# Patient Record
Sex: FEMALE | Race: WHITE | NOT HISPANIC OR LATINO | Employment: OTHER | URBAN - METROPOLITAN AREA
[De-identification: names, ages, dates, MRNs, and addresses within clinical notes are randomized per-mention and may not be internally consistent; named-entity substitution may affect disease eponyms.]

---

## 2022-07-13 ENCOUNTER — TRANSFERRED RECORDS (OUTPATIENT)
Dept: HEALTH INFORMATION MANAGEMENT | Facility: CLINIC | Age: 67
End: 2022-07-13

## 2022-07-15 ENCOUNTER — TRANSFERRED RECORDS (OUTPATIENT)
Dept: HEALTH INFORMATION MANAGEMENT | Facility: CLINIC | Age: 67
End: 2022-07-15

## 2022-10-24 ENCOUNTER — TRANSFERRED RECORDS (OUTPATIENT)
Dept: HEALTH INFORMATION MANAGEMENT | Facility: CLINIC | Age: 67
End: 2022-10-24

## 2022-11-04 ENCOUNTER — TRANSFERRED RECORDS (OUTPATIENT)
Dept: HEALTH INFORMATION MANAGEMENT | Facility: CLINIC | Age: 67
End: 2022-11-04

## 2023-01-10 ENCOUNTER — TRANSFERRED RECORDS (OUTPATIENT)
Dept: HEALTH INFORMATION MANAGEMENT | Facility: CLINIC | Age: 68
End: 2023-01-10

## 2023-01-30 ENCOUNTER — TRANSFERRED RECORDS (OUTPATIENT)
Dept: HEALTH INFORMATION MANAGEMENT | Facility: CLINIC | Age: 68
End: 2023-01-30

## 2023-02-02 ENCOUNTER — TRANSFERRED RECORDS (OUTPATIENT)
Dept: HEALTH INFORMATION MANAGEMENT | Facility: CLINIC | Age: 68
End: 2023-02-02

## 2023-02-02 LAB — EJECTION FRACTION: 60 %

## 2023-03-03 ENCOUNTER — TRANSFERRED RECORDS (OUTPATIENT)
Dept: HEALTH INFORMATION MANAGEMENT | Facility: CLINIC | Age: 68
End: 2023-03-03

## 2023-04-12 ENCOUNTER — TRANSFERRED RECORDS (OUTPATIENT)
Dept: HEALTH INFORMATION MANAGEMENT | Facility: CLINIC | Age: 68
End: 2023-04-12

## 2023-11-10 ENCOUNTER — TRANSFERRED RECORDS (OUTPATIENT)
Dept: HEALTH INFORMATION MANAGEMENT | Facility: CLINIC | Age: 68
End: 2023-11-10

## 2023-11-17 ENCOUNTER — TRANSFERRED RECORDS (OUTPATIENT)
Dept: HEALTH INFORMATION MANAGEMENT | Facility: CLINIC | Age: 68
End: 2023-11-17

## 2024-03-08 ENCOUNTER — TRANSFERRED RECORDS (OUTPATIENT)
Dept: HEALTH INFORMATION MANAGEMENT | Facility: CLINIC | Age: 69
End: 2024-03-08

## 2024-04-03 ENCOUNTER — TRANSFERRED RECORDS (OUTPATIENT)
Dept: MULTI SPECIALTY CLINIC | Facility: CLINIC | Age: 69
End: 2024-04-03

## 2024-05-02 ENCOUNTER — TRANSFERRED RECORDS (OUTPATIENT)
Dept: HEALTH INFORMATION MANAGEMENT | Facility: CLINIC | Age: 69
End: 2024-05-02

## 2024-06-05 ENCOUNTER — TRANSFERRED RECORDS (OUTPATIENT)
Dept: HEALTH INFORMATION MANAGEMENT | Facility: CLINIC | Age: 69
End: 2024-06-05

## 2024-07-10 ENCOUNTER — TRANSFERRED RECORDS (OUTPATIENT)
Dept: HEALTH INFORMATION MANAGEMENT | Facility: CLINIC | Age: 69
End: 2024-07-10

## 2024-08-29 ENCOUNTER — TELEPHONE (OUTPATIENT)
Dept: INTERNAL MEDICINE | Facility: CLINIC | Age: 69
End: 2024-08-29
Payer: MEDICARE

## 2024-08-29 NOTE — TELEPHONE ENCOUNTER
Reason for Call:  Appointment Request    Patient requesting this type of appt: Chronic Diease Management/Medication/Follow-Up    Requested provider:  Dr. Rodrigez    Reason patient unable to be scheduled: Needs to be scheduled by clinic    When does patient want to be seen/preferred time: 1-2 weeks    Comments: New patient and her  Cristofer Ball are wanting to Est care and looking to schedule appointments back to back within the next couple weeks..     Okay to leave a detailed message?: Yes at Home number on file [phone] (home)    Call taken on 8/29/2024 at 10:41 AM by Gabby Dickerson

## 2024-09-23 ENCOUNTER — TELEPHONE (OUTPATIENT)
Dept: INTERNAL MEDICINE | Facility: CLINIC | Age: 69
End: 2024-09-23
Payer: MEDICARE

## 2024-09-23 NOTE — TELEPHONE ENCOUNTER
Patient called in stating she has been testing positive for COVID for the last 5 days. She states her first day of symptom was Andres 9/15/24. She is wondering if she can come for her appointment tomorrow and wear a mask or if she needs to reschedule. She was notified that we do not exclude patients from being seen in clinic due to having COVID, but that we could check with the provider to see if her visit could be done virtually. She states she will come to the clinic with a mask on.       Anay Garcia, VELASQUEZN, RN

## 2024-09-24 ENCOUNTER — OFFICE VISIT (OUTPATIENT)
Dept: INTERNAL MEDICINE | Facility: CLINIC | Age: 69
End: 2024-09-24
Payer: MEDICARE

## 2024-09-24 VITALS
HEIGHT: 63 IN | HEART RATE: 102 BPM | WEIGHT: 225 LBS | DIASTOLIC BLOOD PRESSURE: 73 MMHG | SYSTOLIC BLOOD PRESSURE: 117 MMHG | RESPIRATION RATE: 21 BRPM | BODY MASS INDEX: 39.87 KG/M2 | TEMPERATURE: 98.3 F | OXYGEN SATURATION: 96 %

## 2024-09-24 DIAGNOSIS — R60.0 FLUID RETENTION IN LEGS: ICD-10-CM

## 2024-09-24 DIAGNOSIS — E66.01 OBESITY, MORBID, BMI 40.0-49.9 (H): ICD-10-CM

## 2024-09-24 DIAGNOSIS — U07.1 INFECTION DUE TO 2019 NOVEL CORONAVIRUS: ICD-10-CM

## 2024-09-24 DIAGNOSIS — I10 ESSENTIAL HYPERTENSION: ICD-10-CM

## 2024-09-24 DIAGNOSIS — H90.3 BILATERAL SENSORINEURAL HEARING LOSS: Primary | ICD-10-CM

## 2024-09-24 DIAGNOSIS — R19.8 IRREGULAR BOWEL HABITS: ICD-10-CM

## 2024-09-24 PROCEDURE — 99204 OFFICE O/P NEW MOD 45 MIN: CPT | Performed by: INTERNAL MEDICINE

## 2024-09-24 RX ORDER — CELECOXIB 200 MG/1
200 CAPSULE ORAL DAILY
COMMUNITY
Start: 2024-09-24

## 2024-09-24 RX ORDER — TELMISARTAN AND HYDROCHLORTHIAZIDE 80; 25 MG/1; MG/1
1 TABLET ORAL DAILY
COMMUNITY
Start: 2024-09-24

## 2024-09-24 RX ORDER — ASPIRIN 81 MG/1
81 TABLET, CHEWABLE ORAL DAILY
COMMUNITY
Start: 2024-09-24

## 2024-09-24 RX ORDER — CALCIUM CARBONATE 500(1250)
1 TABLET ORAL 2 TIMES DAILY
COMMUNITY
Start: 2024-09-24

## 2024-09-24 RX ORDER — POTASSIUM CHLORIDE 1500 MG/1
20 TABLET, EXTENDED RELEASE ORAL DAILY
COMMUNITY
Start: 2024-09-24

## 2024-09-24 RX ORDER — METAPROTERENOL SULFATE 10 MG
500 TABLET ORAL DAILY
COMMUNITY
Start: 2024-09-24

## 2024-09-24 RX ORDER — ROSUVASTATIN CALCIUM 5 MG/1
5 TABLET, COATED ORAL DAILY
COMMUNITY
Start: 2024-09-24

## 2024-09-24 RX ORDER — FUROSEMIDE 40 MG
40 TABLET ORAL DAILY
COMMUNITY
Start: 2024-09-24

## 2024-09-24 RX ORDER — CHOLECALCIFEROL (VITAMIN D3) 50 MCG
1 TABLET ORAL 2 TIMES DAILY
COMMUNITY
Start: 2024-09-24

## 2024-09-24 ASSESSMENT — PAIN SCALES - GENERAL: PAINLEVEL: MODERATE PAIN (5)

## 2024-09-24 NOTE — PROGRESS NOTES
"  Assessment & Plan   Problem List Items Addressed This Visit       Obesity, morbid, BMI 40.0-49.9 (H)     Other Visit Diagnoses       Bilateral sensorineural hearing loss    -  Primary    Relevant Orders    Adult Audiology  Referral    Irregular bowel habits        Relevant Orders    Adult GI  Referral - Consult Only    Essential hypertension        Relevant Medications    telmisartan-HCTZ (MICARDIS HCT) 80-25 MG tablet    Fluid retention in legs        Relevant Medications    celecoxib (CELEBREX) 200 MG capsule    aspirin (ASA) 81 MG chewable tablet           Patient is moving here from [facility].  She is here to establish care.  She is   Obesity needs to work on exercise and weight loss.  Hypertension she is on telmisartan and hydrochlorothiazide, blood pressure is doing well labs were okay in May.  Fluid retention in her legs is chronic and she is on Lasix 40 mg a day for many years.    Irregular bowel habits she has had some colitis workup recommended to follow-up with GI had a colonoscopy last year.  GI referral was placed    Hearing loss has hearing aids we will refer to audiology for this.    Patient is just getting over COVID infection her lungs are clear today she is feeling much better she will wait 90 days to get a COVID-vaccine and will think about getting the flu shot in the next month.    Follow-up in the future for annual wellness exam.       BMI  Estimated body mass index is 40.24 kg/m  as calculated from the following:    Height as of this encounter: 1.593 m (5' 2.7\").    Weight as of this encounter: 102.1 kg (225 lb).             Eugene Avery is a 68 year old, presenting for the following health issues:  Establish Care (COVID + 9/20/24)      9/24/2024     2:59 PM   Additional Questions   Roomed by Janett LAND     Via the Health Maintenance questionnaire, the patient has reported the following services have been completed DEXA: [facility] nd " "2024-02-26-Colonscopy: [facility] 2024-04-03, this information has been sent to the abstraction team.  History of Present Illness       Reason for visit:  Establish care with new dr as i recently moved here   She is taking medications regularly.      moved here from [facility].     Hearing loss and has a hearing aid.      Mammogram done June 2024.    Arthritis in her body, knees had synvysc injections.     No known heart disease, had an abnormal stress echo but normal PET stress test. No symptoms.     Blood pressure on medication, been doing ok,     Had a colonoscopy for irregular bowels uses suppositories need prescription suppositories, needs to see GI.     Had covid recently and is better now.         Objective    /73   Pulse 102   Temp 98.3  F (36.8  C) (Temporal)   Resp 21   Ht 1.593 m (5' 2.7\")   Wt 102.1 kg (225 lb)   SpO2 96%   BMI 40.24 kg/m    Body mass index is 40.24 kg/m .  Physical Exam   GENERAL: alert and no distress  NECK: no adenopathy, no asymmetry, masses, or scars  RESP: lungs clear to auscultation - no rales, rhonchi or wheezes  CV: regular rate and rhythm, normal S1 S2, no S3 or S4, no murmur, click or rub, no peripheral edema  ABDOMEN: soft, nontender, no hepatosplenomegaly, no masses and bowel sounds normal  MS: no gross musculoskeletal defects noted, no edema            Signed Electronically by: Ruben Rodrigez MD    " Attending Only

## 2024-09-27 ENCOUNTER — TELEPHONE (OUTPATIENT)
Dept: GASTROENTEROLOGY | Facility: CLINIC | Age: 69
End: 2024-09-27

## 2024-09-27 NOTE — TELEPHONE ENCOUNTER
[facility] Call Center    Phone Message    May a detailed message be left on voicemail: Yes    Reason for Call: Other: Patient is currently scheduled on 12/06/2024, as visit type New GI Urgent. This is outside the expected timeline for this referral. Patient has been added to the waitlist.      Action Taken: Message routed to:  Other: GI REFERRAL TRIAGE POOL     Travel Screening: Not Applicable

## 2024-10-04 NOTE — TELEPHONE ENCOUNTER
Clinic Navigator sent a [employer] message to inform the Pt that Pt is on the wait list for a sooner appointment. Clinic Navigator will reach out to the Pt via phone call or [employer] when a sooner appointment becomes available.

## 2024-10-06 ENCOUNTER — HEALTH MAINTENANCE LETTER (OUTPATIENT)
Age: 69
End: 2024-10-06

## 2024-10-14 ENCOUNTER — OFFICE VISIT (OUTPATIENT)
Dept: INTERNAL MEDICINE | Facility: CLINIC | Age: 69
End: 2024-10-14
Payer: MEDICARE

## 2024-10-14 VITALS
OXYGEN SATURATION: 98 % | SYSTOLIC BLOOD PRESSURE: 110 MMHG | BODY MASS INDEX: 40.33 KG/M2 | WEIGHT: 227.6 LBS | RESPIRATION RATE: 16 BRPM | TEMPERATURE: 97.4 F | HEART RATE: 94 BPM | HEIGHT: 63 IN | DIASTOLIC BLOOD PRESSURE: 62 MMHG

## 2024-10-14 DIAGNOSIS — R30.0 DYSURIA: ICD-10-CM

## 2024-10-14 DIAGNOSIS — G89.29 CHRONIC PAIN OF RIGHT KNEE: Primary | ICD-10-CM

## 2024-10-14 DIAGNOSIS — I10 ESSENTIAL HYPERTENSION: ICD-10-CM

## 2024-10-14 DIAGNOSIS — M25.561 CHRONIC PAIN OF RIGHT KNEE: Primary | ICD-10-CM

## 2024-10-14 LAB
ALBUMIN UR-MCNC: NEGATIVE MG/DL
APPEARANCE UR: CLEAR
BILIRUB UR QL STRIP: NEGATIVE
COLOR UR AUTO: NORMAL
GLUCOSE UR STRIP-MCNC: NEGATIVE MG/DL
HGB UR QL STRIP: NEGATIVE
KETONES UR STRIP-MCNC: NEGATIVE MG/DL
LEUKOCYTE ESTERASE UR QL STRIP: NEGATIVE
NITRATE UR QL: NEGATIVE
PH UR STRIP: 5 [PH] (ref 5–7)
SP GR UR STRIP: 1.01 (ref 1–1.03)
UROBILINOGEN UR STRIP-MCNC: NORMAL MG/DL

## 2024-10-14 PROCEDURE — G2211 COMPLEX E/M VISIT ADD ON: HCPCS | Performed by: INTERNAL MEDICINE

## 2024-10-14 PROCEDURE — 99213 OFFICE O/P EST LOW 20 MIN: CPT | Performed by: INTERNAL MEDICINE

## 2024-10-14 PROCEDURE — 81003 URINALYSIS AUTO W/O SCOPE: CPT | Performed by: INTERNAL MEDICINE

## 2024-10-14 ASSESSMENT — PAIN SCALES - GENERAL: PAINLEVEL: SEVERE PAIN (7)

## 2024-10-14 NOTE — PROGRESS NOTES
"  Assessment & Plan   Problem List Items Addressed This Visit    None  Visit Diagnoses       Essential hypertension    -  Primary    Dysuria        Relevant Orders    UA Macroscopic with reflex to Microscopic and Culture - Lab Collect    Chronic pain of right knee        Relevant Orders    Orthopedic  Referral           Right knee pain  Concern for osteoarthritis progression. Exam and history are consistent with osteoarthritis exacerbation. RA is less likely as Gena does not have severe inflammation of the joints and nodules on phalanges are at the DIP versus the PIP, making osteoarthritis more likely. Also less concerning for an acute infection as there is not swelling, redness or diffuse tenderness with the knee exam. Also no systemic symptoms such as fever or chills. Plan moving forward is a referral to orthopedics for evaluation of another injection versus a knee replacement.     Back pain  Originally concerning for a urinary tract infection due to itching with urination and a history of UTIs. Urinalysis is negative for UTI currently. Most likely cause of the back pain is abnormal alignment from the exacerbated knee pain over the last few weeks. Improvement in knee symptoms and continued improvement in back pain since then also makes this more likely. Plan moving forward is to monitor back pain for increase in pain or change in symptoms. Follow-up in clinic if pain becomes worse.    BMI  Estimated body mass index is 40.7 kg/m  as calculated from the following:    Height as of this encounter: 1.593 m (5' 2.7\").    Weight as of this encounter: 103.2 kg (227 lb 9.6 oz).       CONSULTATION/REFERRAL to Orthopedics for chronic osteoarthritis.      The longitudinal plan of care for the diagnosis(es)/condition(s) as documented were addressed during this visit. Due to the added complexity in care, I will continue to support Gena in the subsequent management and with ongoing continuity of " care.      Subjective   Gena is a 68 year old, presenting for the following health issues:  Knee Pain and Back Pain        10/14/2024    10:37 AM   Additional Questions   Roomed by Criselda MONTIEL         10/14/2024    10:37 AM   Patient Reported Additional Medications   Patient reports taking the following new medications Co-Q-10 200 mg, Fish oil     History of Present Illness       Back Pain:  She presents for follow up of back pain. Patient's back pain is a chronic problem.  Location of back pain:  Left upper back, right side of neck and left side of neck  Description of back pain: stabbing  Back pain spreads: nowhere    Since patient first noticed back pain, pain is: always present, but gets better and worse  Does back pain interfere with her job:  Not applicable       Reason for visit:  Significant knee pain (right side in particular) and back pain    She eats 2-3 servings of fruits and vegetables daily.She consumes 1 sweetened beverage(s) daily.She exercises with enough effort to increase her heart rate 20 to 29 minutes per day.  She exercises with enough effort to increase her heart rate 4 days per week.   She is taking medications regularly.     Gena presents today with concerns of back pain and knee pain. She says she has had a long history of osteoarthritis and has had multiple injections of steroids in her knees as well as one shot of sinvisc. The sinvisc shot did provide relief for her in July. She reported that last week she had increased pain and was unable to walk well for the entire weekend. She said that there was an increase in back pain that correlated with the increased knee pain. She says her back pain has improved with slight improvement of knee pain. She is concerned for a kidney infection with her back pain as she is prone to UTIs and feels she has had some increased itching with urination. She doesn't report any pain or burning or blood with urination. She also brought up nodules on her  "distal knuckles and was concerned for RA. Reassurance was given that RA typically affects the proximal knuckles and the distal nodules are more consistent with an osteoarthritis diagnosis.     Flu shot received at an outside location per Gena.Waiting until December to get COVID-19 booster as she was recently COVID positive.            ROS negative except those listed above.      Objective    /62   Pulse 94   Temp 97.4  F (36.3  C) (Temporal)   Resp 16   Ht 1.593 m (5' 2.7\")   Wt 103.2 kg (227 lb 9.6 oz)   SpO2 98%   BMI 40.70 kg/m    Body mass index is 40.7 kg/m .  Physical Exam   GENERAL: alert and no distress  MS: tenderness to palpation along the medial joint line of R. Knee. Minor swelling in medial joint line of the r. Knee. Clicking felt on extension of r. Knee. Left knee insignificant.     Results for orders placed or performed in visit on 10/14/24 (from the past 24 hour(s))   UA Macroscopic with reflex to Microscopic and Culture - Lab Collect    Specimen: Urine, NOS   Result Value Ref Range    Color Urine Straw Colorless, Straw, Light Yellow, Yellow    Appearance Urine Clear Clear    Glucose Urine Negative Negative mg/dL    Bilirubin Urine Negative Negative    Ketones Urine Negative Negative mg/dL    Specific Gravity Urine 1.006 1.003 - 1.035    Blood Urine Negative Negative    pH Urine 5.0 5.0 - 7.0    Protein Albumin Urine Negative Negative mg/dL    Urobilinogen Urine Normal Normal, 2.0 mg/dL    Nitrite Urine Negative Negative    Leukocyte Esterase Urine Negative Negative    Narrative    Microscopic not indicated         Александр James, MS3  Medical Student      I was present with the medical student who participated in the service and in the documentation of the note. I have verified the history and personally performed the physical exam and medical decision making. I reviewed the note in detail and edited it appropriately. I agree with the assessment and plan of care as documented in the " note.     Signed Electronically by: Ruben Rodrigez MD

## 2024-10-15 ENCOUNTER — PATIENT OUTREACH (OUTPATIENT)
Dept: CARE COORDINATION | Facility: CLINIC | Age: 69
End: 2024-10-15
Payer: MEDICARE

## 2024-10-17 NOTE — PROGRESS NOTES
ORTHOPEDIC CONSULT      Chief Complaint: Gena Houston is a 68 year old female who is being seen for   Chief Complaint   Patient presents with    Right Knee - Pain       History of Present Illness:   Here for right knee.  Reports many years of pain, last year getting much worse. Progressive. Constant pain.  Medial sided. Non radiating.  The left side is mildly bothersome and currently not painful.  No specific injuries to the knee.  Treatments trieid: previous cortisone injection in distant past, previous Synvisc 1 injections, formal physical therapy in the past for the ankles that also worked on the legs and knees, tylenol, rest, activity modification. Despite this having constant progressive pain, waking her at night, reports unable to walk >1 block before pain limits activity, decreased other activities.     Epic reviewed.  Outside orthopedics July 18, 2024 underwent bilateral Synvisc 1 injections.         Patient's past medical, surgical, social and family histories reviewed.     No past medical history on file.    No past surgical history on file.    Medications:  Current Outpatient Medications   Medication Sig Dispense Refill    aspirin (ASA) 81 MG chewable tablet Take 1 tablet (81 mg) by mouth daily.      calcium carbonate (OS-FRANCISCO) 500 MG tablet Take 1 tablet (500 mg) by mouth 2 times daily.      celecoxib (CELEBREX) 200 MG capsule Take 1 capsule (200 mg) by mouth daily.      conjugated estrogens (PREMARIN) 0.625 MG/GM vaginal cream Place 0.5 g vaginally twice a week. (Patient not taking: Reported on 10/14/2024)      furosemide (LASIX) 40 MG tablet Take 1 tablet (40 mg) by mouth daily.      Omega-3 Fish Oil 500 MG capsule Take 1 capsule (500 mg) by mouth daily.      potassium chloride ryan ER (KLOR-CON M20) 20 MEQ CR tablet Take 1 tablet (20 mEq) by mouth daily.      rosuvastatin (CRESTOR) 5 MG tablet Take 1 tablet (5 mg) by mouth daily.      telmisartan-HCTZ (MICARDIS HCT) 80-25 MG tablet Take 1 tablet  "by mouth daily.      vitamin D3 (CHOLECALCIFEROL) 50 mcg (2000 units) tablet Take 1 tablet (50 mcg) by mouth 2 times daily.       No current facility-administered medications for this visit.       Allergies   Allergen Reactions    Penicillins Anaphylaxis    Gadolinium     Latex     Mtx Topical Pain [Lidocaine-Menthol]     Shellfish-Derived Products     Sulfa Antibiotics Hives       Social History     Occupational History    Not on file   Tobacco Use    Smoking status: Former     Types: Cigarettes    Smokeless tobacco: Not on file   Substance and Sexual Activity    Alcohol use: Not on file    Drug use: Not on file    Sexual activity: Not on file       No family history on file.    REVIEW OF SYSTEMS  10 point review systems performed otherwise negative as noted as per history of present illness.    Physical Exam:  Vitals: /77   Pulse 78   Temp 97.1  F (36.2  C)   Ht 1.593 m (5' 2.7\")   Wt 104.6 kg (230 lb 8 oz)   BMI 41.22 kg/m    BMI= Body mass index is 41.22 kg/m .  Constitutional: healthy, alert and no acute distress   Psychiatric: mentation appears normal and affect normal/bright  NEURO: no focal deficits  RESP: Normal with easy respirations and no use of accessory muscles to breathe, no audible wheezing or retractions  CV: No peripheral edema         Regular rate and rhythm by palpation  SKIN: No erythema, rashes, excoriation, or breakdown. No evidence of infection.   JOINT/EXTREMITIES:right knee: no effusion. AROM 0-110. Pain with maximum flexion/extension.  No gross instability with valgus or varus although some pseudolaxity medial valgus stressing. Tender along medial and somewhat lateral joint lines. Extensor intact. Calf soft non-tender.  Overall varus alignment.  The knee does fully correct.     GAIT: antalgic    Diagnostic Modalities:  Right knee x-ray: Dated June 4, 2024 weightbearing views taken at outside institution reviewed.  Severe bone-on-bone medial compartment arthritis with small " rimming osteophytes.  Lateral compartment patellofemoral fairly well-preserved.    Independent visualization of the images was performed.      Impression: right knee primary osteoarthritis    Plan:  All of the above pertinent physical exam and imaging modalities findings was reviewed with Gena.                                          INJECTION PROCEDURE:  The patient was counseled about an  injection, including discussion of risks (including infection), contents of the injection, rationale for performing the injection, and expected benefits of the injection. The skin was prepped with alcohol and betadine and then utilizing sterile technique an injection of the right knee joint from the anterolateral approach in the seated position was performed. The injection consisted 1ml of Kenalog (40mg per 1 ml) mixed with 3ml of 0.5% Marcaine. The patient tolerated the injection well, and there were no complications. The injection site was covered with a Band-Aid. The injection was performed by Germain Akbar, APRN, CNP, DNP    We had a discussion regarding treatment options for osteoarthritis.  He has had previous intra-articular steroid injection as well as Synvisc injections.  They did provide good relief although transient.  We discussed continued conservative care including injections physical therapy as well as discussing knee replacement.  She is apprehensive about proceeding with knee replacement at this point.  Therefore we plan to proceed with a intra-articular steroid injection as noted above as well as a physical therapy referral.  Lastly we reviewed weight loss.    Return to clinic 4-6 , week(s), PRN, or sooner as needed for changes.  Re-x-ray on return: No    Levi Morgan D.O.

## 2024-10-18 ENCOUNTER — OFFICE VISIT (OUTPATIENT)
Dept: ORTHOPEDICS | Facility: CLINIC | Age: 69
End: 2024-10-18
Attending: INTERNAL MEDICINE
Payer: MEDICARE

## 2024-10-18 VITALS
HEART RATE: 78 BPM | DIASTOLIC BLOOD PRESSURE: 77 MMHG | HEIGHT: 63 IN | SYSTOLIC BLOOD PRESSURE: 118 MMHG | TEMPERATURE: 97.1 F | WEIGHT: 230.5 LBS | BODY MASS INDEX: 40.84 KG/M2

## 2024-10-18 DIAGNOSIS — M17.11 PRIMARY OSTEOARTHRITIS OF RIGHT KNEE: Primary | ICD-10-CM

## 2024-10-18 PROCEDURE — 99203 OFFICE O/P NEW LOW 30 MIN: CPT | Mod: 25 | Performed by: ORTHOPAEDIC SURGERY

## 2024-10-18 PROCEDURE — 20610 DRAIN/INJ JOINT/BURSA W/O US: CPT | Mod: RT | Performed by: ORTHOPAEDIC SURGERY

## 2024-10-18 RX ORDER — TRIAMCINOLONE ACETONIDE 40 MG/ML
40 INJECTION, SUSPENSION INTRA-ARTICULAR; INTRAMUSCULAR
Status: COMPLETED | OUTPATIENT
Start: 2024-10-18 | End: 2024-10-18

## 2024-10-18 RX ORDER — BUPIVACAINE HYDROCHLORIDE 5 MG/ML
3 INJECTION, SOLUTION PERINEURAL
Status: COMPLETED | OUTPATIENT
Start: 2024-10-18 | End: 2024-10-18

## 2024-10-18 RX ADMIN — TRIAMCINOLONE ACETONIDE 40 MG: 40 INJECTION, SUSPENSION INTRA-ARTICULAR; INTRAMUSCULAR at 08:34

## 2024-10-18 RX ADMIN — BUPIVACAINE HYDROCHLORIDE 3 ML: 5 INJECTION, SOLUTION PERINEURAL at 08:34

## 2024-10-18 NOTE — LETTER
10/18/2024      Gena Houston  [address] [address]      Dear Colleague,    Thank you for referring your patient, Gena Houston, to the [facility]. Please see a copy of my visit note below.    ORTHOPEDIC CONSULT      Chief Complaint: Gena Houston is a 68 year old female who is being seen for   Chief Complaint   Patient presents with     Right Knee - Pain       History of Present Illness:   Here for right knee.  Reports many years of pain, last year getting much worse. Progressive. Constant pain.  Medial sided. Non radiating.  The left side is mildly bothersome and currently not painful.  No specific injuries to the knee.  Treatments trieid: previous cortisone injection in distant past, previous Synvisc 1 injections, formal physical therapy in the past for the ankles that also worked on the legs and knees, tylenol, rest, activity modification. Despite this having constant progressive pain, waking her at night, reports unable to walk >1 block before pain limits activity, decreased other activities.     Epic reviewed.  Outside orthopedics July 18, 2024 underwent bilateral Synvisc 1 injections.         Patient's past medical, surgical, social and family histories reviewed.     No past medical history on file.    No past surgical history on file.    Medications:  Current Outpatient Medications   Medication Sig Dispense Refill     aspirin (ASA) 81 MG chewable tablet Take 1 tablet (81 mg) by mouth daily.       calcium carbonate (OS-FRANCISCO) 500 MG tablet Take 1 tablet (500 mg) by mouth 2 times daily.       celecoxib (CELEBREX) 200 MG capsule Take 1 capsule (200 mg) by mouth daily.       conjugated estrogens (PREMARIN) 0.625 MG/GM vaginal cream Place 0.5 g vaginally twice a week. (Patient not taking: Reported on 10/14/2024)       furosemide (LASIX) 40 MG tablet Take 1 tablet (40 mg) by mouth daily.       Omega-3 Fish Oil 500 MG capsule Take 1 capsule (500 mg) by mouth daily.    "    potassium chloride ryan ER (KLOR-CON M20) 20 MEQ CR tablet Take 1 tablet (20 mEq) by mouth daily.       rosuvastatin (CRESTOR) 5 MG tablet Take 1 tablet (5 mg) by mouth daily.       telmisartan-HCTZ (MICARDIS HCT) 80-25 MG tablet Take 1 tablet by mouth daily.       vitamin D3 (CHOLECALCIFEROL) 50 mcg (2000 units) tablet Take 1 tablet (50 mcg) by mouth 2 times daily.       No current facility-administered medications for this visit.       Allergies   Allergen Reactions     Penicillins Anaphylaxis     Gadolinium      Latex      Mtx Topical Pain [Lidocaine-Menthol]      Shellfish-Derived Products      Sulfa Antibiotics Hives       Social History     Occupational History     Not on file   Tobacco Use     Smoking status: Former     Types: Cigarettes     Smokeless tobacco: Not on file   Substance and Sexual Activity     Alcohol use: Not on file     Drug use: Not on file     Sexual activity: Not on file       No family history on file.    REVIEW OF SYSTEMS  10 point review systems performed otherwise negative as noted as per history of present illness.    Physical Exam:  Vitals: /77   Pulse 78   Temp 97.1  F (36.2  C)   Ht 1.593 m (5' 2.7\")   Wt 104.6 kg (230 lb 8 oz)   BMI 41.22 kg/m    BMI= Body mass index is 41.22 kg/m .  Constitutional: healthy, alert and no acute distress   Psychiatric: mentation appears normal and affect normal/bright  NEURO: no focal deficits  RESP: Normal with easy respirations and no use of accessory muscles to breathe, no audible wheezing or retractions  CV: No peripheral edema         Regular rate and rhythm by palpation  SKIN: No erythema, rashes, excoriation, or breakdown. No evidence of infection.   JOINT/EXTREMITIES:right knee: no effusion. AROM 0-110. Pain with maximum flexion/extension.  No gross instability with valgus or varus although some pseudolaxity medial valgus stressing. Tender along medial and somewhat lateral joint lines. Extensor intact. Calf soft non-tender.  " Overall varus alignment.  The knee does fully correct.     GAIT: antalgic    Diagnostic Modalities:  Right knee x-ray: Dated June 4, 2024 weightbearing views taken at outside institution reviewed.  Severe bone-on-bone medial compartment arthritis with small rimming osteophytes.  Lateral compartment patellofemoral fairly well-preserved.    Independent visualization of the images was performed.      Impression: right knee primary osteoarthritis    Plan:  All of the above pertinent physical exam and imaging modalities findings was reviewed with Gena.                                          INJECTION PROCEDURE:  The patient was counseled about an  injection, including discussion of risks (including infection), contents of the injection, rationale for performing the injection, and expected benefits of the injection. The skin was prepped with alcohol and betadine and then utilizing sterile technique an injection of the right knee joint from the anterolateral approach in the seated position was performed. The injection consisted 1ml of Kenalog (40mg per 1 ml) mixed with 3ml of 0.5% Marcaine. The patient tolerated the injection well, and there were no complications. The injection site was covered with a Band-Aid. The injection was performed by Germain Akbar, NENO, CNP, DNP    We had a discussion regarding treatment options for osteoarthritis.  He has had previous intra-articular steroid injection as well as Synvisc injections.  They did provide good relief although transient.  We discussed continued conservative care including injections physical therapy as well as discussing knee replacement.  She is apprehensive about proceeding with knee replacement at this point.  Therefore we plan to proceed with a intra-articular steroid injection as noted above as well as a physical therapy referral.  Lastly we reviewed weight loss.    Return to clinic 4-6 , week(s), PRN, or sooner as needed for changes.  Re-x-ray on return:  No    Levi Morgan D.O.    Large Joint Injection/Arthocentesis: R knee joint    Date/Time: 10/18/2024 8:34 AM    Performed by: Emeka Akbar APRN CNP  Authorized by: Camron Morgan DO    Indications:  Pain  Needle Size:  25 G  Guidance: landmark guided    Approach:  Anterolateral  Location:  Knee      Medications:  40 mg triamcinolone 40 MG/ML; 3 mL BUPivacaine 0.5 %  Medications comment:  4ml 0.5% bupivicaine  NDC:15409-371-47  Lot: [id number]  3/30/20    Outcome:  Tolerated well, no immediate complications  Procedure discussed: discussed risks, benefits, and alternatives    Consent Given by:  Patient  Timeout: timeout called immediately prior to procedure    Prep: patient was prepped and draped in usual sterile fashion            Again, thank you for allowing me to participate in the care of your patient.        Sincerely,        Camron Morgan DO

## 2024-10-18 NOTE — PROGRESS NOTES
Large Joint Injection/Arthocentesis: R knee joint    Date/Time: 10/18/2024 8:34 AM    Performed by: Emeka Akbar APRN CNP  Authorized by: Camron Morgan DO    Indications:  Pain  Needle Size:  25 G  Guidance: landmark guided    Approach:  Anterolateral  Location:  Knee      Medications:  40 mg triamcinolone 40 MG/ML; 3 mL BUPivacaine 0.5 %  Medications comment:  4ml 0.5% bupivicaine  NDC:90404-449-81  Lot: [id number]  3/30/20    Outcome:  Tolerated well, no immediate complications  Procedure discussed: discussed risks, benefits, and alternatives    Consent Given by:  Patient  Timeout: timeout called immediately prior to procedure    Prep: patient was prepped and draped in usual sterile fashion

## 2024-10-19 ASSESSMENT — ACTIVITIES OF DAILY LIVING (ADL)
GO UP STAIRS: ACTIVITY IS VERY DIFFICULT
SQUAT: ACTIVITY IS VERY DIFFICULT
STIFFNESS: THE SYMPTOM AFFECTS MY ACTIVITY MODERATELY
GO UP STAIRS: ACTIVITY IS VERY DIFFICULT
AS_A_RESULT_OF_YOUR_KNEE_INJURY,_HOW_WOULD_YOU_RATE_YOUR_CURRENT_LEVEL_OF_DAILY_ACTIVITY?: ABNORMAL
SWELLING: THE SYMPTOM AFFECTS MY ACTIVITY MODERATELY
HOW_WOULD_YOU_RATE_THE_OVERALL_FUNCTION_OF_YOUR_KNEE_DURING_YOUR_USUAL_DAILY_ACTIVITIES?: ABNORMAL
RAW_SCORE: 21
PAIN: THE SYMPTOM AFFECTS MY ACTIVITY SEVERELY
SIT WITH YOUR KNEE BENT: ACTIVITY IS MINIMALLY DIFFICULT
LIMPING: THE SYMPTOM AFFECTS MY ACTIVITY SEVERELY
PAIN: THE SYMPTOM AFFECTS MY ACTIVITY SEVERELY
KNEEL ON THE FRONT OF YOUR KNEE: I AM UNABLE TO DO THE ACTIVITY
RISE FROM A CHAIR: ACTIVITY IS FAIRLY DIFFICULT
RISE FROM A CHAIR: ACTIVITY IS FAIRLY DIFFICULT
KNEEL ON THE FRONT OF YOUR KNEE: I AM UNABLE TO DO THE ACTIVITY
HOW_WOULD_YOU_RATE_THE_CURRENT_FUNCTION_OF_YOUR_KNEE_DURING_YOUR_USUAL_DAILY_ACTIVITIES_ON_A_SCALE_FROM_0_TO_100_WITH_100_BEING_YOUR_LEVEL_OF_KNEE_FUNCTION_PRIOR_TO_YOUR_INJURY_AND_0_BEING_THE_INABILITY_TO_PERFORM_ANY_OF_YOUR_USUAL_DAILY_ACTIVITIES?: 40
STAND: ACTIVITY IS FAIRLY DIFFICULT
GO DOWN STAIRS: ACTIVITY IS VERY DIFFICULT
LIMPING: THE SYMPTOM AFFECTS MY ACTIVITY SEVERELY
GO DOWN STAIRS: ACTIVITY IS VERY DIFFICULT
HOW_WOULD_YOU_RATE_THE_OVERALL_FUNCTION_OF_YOUR_KNEE_DURING_YOUR_USUAL_DAILY_ACTIVITIES?: ABNORMAL
STIFFNESS: THE SYMPTOM AFFECTS MY ACTIVITY MODERATELY
WEAKNESS: THE SYMPTOM AFFECTS MY ACTIVITY SEVERELY
HOW_WOULD_YOU_RATE_THE_CURRENT_FUNCTION_OF_YOUR_KNEE_DURING_YOUR_USUAL_DAILY_ACTIVITIES_ON_A_SCALE_FROM_0_TO_100_WITH_100_BEING_YOUR_LEVEL_OF_KNEE_FUNCTION_PRIOR_TO_YOUR_INJURY_AND_0_BEING_THE_INABILITY_TO_PERFORM_ANY_OF_YOUR_USUAL_DAILY_ACTIVITIES?: 40
SWELLING: THE SYMPTOM AFFECTS MY ACTIVITY MODERATELY
AS_A_RESULT_OF_YOUR_KNEE_INJURY,_HOW_WOULD_YOU_RATE_YOUR_CURRENT_LEVEL_OF_DAILY_ACTIVITY?: ABNORMAL
KNEE_ACTIVITY_OF_DAILY_LIVING_SUM: 21
WALK: ACTIVITY IS VERY DIFFICULT
GIVING WAY, BUCKLING OR SHIFTING OF KNEE: THE SYMPTOM AFFECTS MY ACTIVITY MODERATELY
SQUAT: ACTIVITY IS VERY DIFFICULT
PLEASE_INDICATE_YOR_PRIMARY_REASON_FOR_REFERRAL_TO_THERAPY:: KNEE
WEAKNESS: THE SYMPTOM AFFECTS MY ACTIVITY SEVERELY
SIT WITH YOUR KNEE BENT: ACTIVITY IS MINIMALLY DIFFICULT
WALK: ACTIVITY IS VERY DIFFICULT
KNEE_ACTIVITY_OF_DAILY_LIVING_SCORE: 30
STAND: ACTIVITY IS FAIRLY DIFFICULT
GIVING WAY, BUCKLING OR SHIFTING OF KNEE: THE SYMPTOM AFFECTS MY ACTIVITY MODERATELY

## 2024-10-21 ENCOUNTER — THERAPY VISIT (OUTPATIENT)
Dept: PHYSICAL THERAPY | Facility: CLINIC | Age: 69
End: 2024-10-21
Attending: NURSE PRACTITIONER
Payer: MEDICARE

## 2024-10-21 DIAGNOSIS — M17.11 PRIMARY OSTEOARTHRITIS OF RIGHT KNEE: ICD-10-CM

## 2024-10-21 PROCEDURE — 97110 THERAPEUTIC EXERCISES: CPT | Mod: GP | Performed by: PHYSICAL THERAPIST

## 2024-10-21 PROCEDURE — 97530 THERAPEUTIC ACTIVITIES: CPT | Mod: GP | Performed by: PHYSICAL THERAPIST

## 2024-10-21 PROCEDURE — 97161 PT EVAL LOW COMPLEX 20 MIN: CPT | Mod: GP | Performed by: PHYSICAL THERAPIST

## 2024-10-21 NOTE — PROGRESS NOTES
PHYSICAL THERAPY EVALUATION  Type of Visit: Evaluation       Fall Risk Screen:  Fall screen completed by: PT  Have you fallen 2 or more times in the past year?: No  Have you fallen and had an injury in the past year?: No  Is patient a fall risk?: No    Subjective       Presenting condition or subjective complaint: knee pain primarily due to arthritis; abdulkadir the right knee (pain moderate to severe to unbearable at times). Had R knee injection on 10/18/24 and pt notes that it seemed to help 50% in pain reduction. Pt also has some L knee OA but not as bad as the R. Also has hx of lumbar DDD. Knee pain started to increase in the spring with golfing and really bad the last couple of weeks.   Date of onset: 04/01/24 (approximately)    Relevant medical history: Arthritis; Hearing problems; High blood pressure; Migraines or headaches; Osteoarthritis; Overweight; Pain at night or rest   Dates & types of surgery: tonsils 1969; breast reduction 1999; total hysterectomy 2002; gallbladder 2009?    Prior diagnostic imaging/testing results: X-ray   IMPRESSION:   1. TRICOMPARTMENTAL DEGENERATIVE CHANGES IN THE RIGHT KNEE ESPECIALLY IN THE MEDIAL COMPARTMENT WITH SIGNIFICANT LOSS OF MEDIAL JOINT SPACE ALSO ON THE LEFT SIDE.   Prior therapy history for the same diagnosis, illness or injury: No      Prior Level of Function  Transfers: Independent  Ambulation: Independent  ADL: Independent  IADL: Driving, Finances, Housekeeping, Laundry, Meal preparation, Medication management, Work, Yard work    Living Environment  Social support: With a significant other or spouse   Type of home: [facility]; 1 level   Stairs to enter the home: No       Ramp: No   Stairs inside the home: No       Help at home: Other  Equipment owned:       Employment: No    Hobbies/Interests: walking, golf, some water activities like kayaking, driving sports car, actively playing with granddaughter; word puzzles/games, and others    Patient goals for therapy: walk longer  "distances; go about daily activities without as much or with no pain    Pain assessment: Pain present  See objective evaluation for additional pain details  LBP and R>L knee pain, R medial knee>anterior and the rest of the knee pain. Also has R buttock, posterior thigh, calf and bottom of foot pain.      Objective   KNEE EVALUATION  PAIN: Pain Level at Rest: 5/10  Pain Level with Use: 7/10  Pain Location: R knee is a 3/10 since injection, also has LBP on average 5/10, worst 7/10  Pain Frequency: constant  Pain is Exacerbated By: standing, walking, stairs, squatting, see KOS  Pain is Relieved By: cold, NSAIDs, otc medications, stretch, and wrapping the knee  Pain Progression: Improved  INTEGUMENTARY (edema, incisions):  mid patellar circumference 18\" R and L, Pt with R quad atrophy compared to L.   POSTURE:  In sitting R medial knee pain 3/10 golf ball size, LBP 2/10 pea size. Used lumbar roll for posture correction and R knee decreased to 2/10.   GAIT:  Weightbearing Status: WBAT  Assistive Device(s): None  Gait Deviations: Antalgic  R knee pain to 5/10, trial cane 4/10  BALANCE/PROPRIOCEPTION: WFL  WEIGHTBEARING ALIGNMENT:  In standing R genu varum compared to L  NON-WEIGHTBEARING ALIGNMENT:   ROM:  AROM knee extension to flexion L 0 to 132 degrees, R 12 to 121 degrees with pain at end range of each  STRENGTH:  Poor R QS, able to do SLR on R with minimal pain and extensor lag,  R knee pain with partial squat  FLEXIBILITY:   SPECIAL TESTS:   FUNCTIONAL TESTS:   PALPATION:   JOINT MOBILITY:     Assessment & Plan   CLINICAL IMPRESSIONS  Medical Diagnosis: Primary osteoarthritis of right knee    Treatment Diagnosis: Right knee osteoarthritis   Impression/Assessment: Patient is a 69 year old female with R knee osteoarthritis complaints.  The following significant findings have been identified: Pain, Decreased ROM/flexibility, Decreased strength, Impaired gait, Impaired muscle performance, Decreased activity tolerance, " and Impaired posture. These impairments interfere with their ability to perform self care tasks, work tasks, recreational activities, household chores, driving , household mobility, and community mobility as compared to previous level of function.     Clinical Decision Making (Complexity):  Clinical Presentation: Stable/Uncomplicated  Clinical Presentation Rationale: based on medical and personal factors listed in PT evaluation  Clinical Decision Making (Complexity): Low complexity    PLAN OF CARE  Treatment Interventions:  Modalities: E-stim  Interventions: Gait Training, Manual Therapy, Neuromuscular Re-education, Therapeutic Activity, Therapeutic Exercise, Aquatic Therapy (if needed)    Long Term Goals     PT Goal 1  Goal Identifier: Home Program  Goal Description: Pt to have good understanding of home program to improve R knee ROM and strength and decrease pain so pt can have improved standing and walking with adls  Target Date: 11/21/24  PT Goal 2  Goal Identifier: Function  Goal Description: pt to note a decrease in R knee pain and improve ROM and strength and carry over to improved functional level as measured by KOS score increase from 30% to 50% or better.  Target Date: 12/21/24      Frequency of Treatment: up to 6 visits if needed  Duration of Treatment: 90 days, decrease if able    Recommended Referrals to Other Professionals:   Education Assessment:   Learner/Method: Patient;Reading;Demonstration;Pictures/Video;No Barriers to Learning;Listening  Education Comments: home program    Risks and benefits of evaluation/treatment have been explained.   Patient/Family/caregiver agrees with Plan of Care.     Evaluation Time:     PT Eval, Low Complexity Minutes (89364): 20       Signing Clinician: Nuno Castillo PT        [facility]                                                                                   OUTPATIENT PHYSICAL THERAPY      PLAN OF TREATMENT FOR OUTPATIENT  REHABILITATION   Patient's Last Name, First Name, Gena Hammer YOB: 1955   Provider's Name   [facility]   Medical Record No.  [id number]     Onset Date: 04/01/24 (approximately)  Start of Care Date: 10/21/24     Medical Diagnosis:  Primary osteoarthritis of right knee      PT Treatment Diagnosis:  Right knee osteoarthritis Plan of Treatment  Frequency/Duration: up to 6 visits if needed/ 90 days, decrease if able    Certification date from 10/21/24 to 01/18/25         See note for plan of treatment details and functional goals     Nuno Castillo, PT                         I CERTIFY THE NEED FOR THESE SERVICES FURNISHED UNDER        THIS PLAN OF TREATMENT AND WHILE UNDER MY CARE     (Physician attestation of this document indicates review and certification of the therapy plan).              Referring Provider:  Emeka Akbar    Initial Assessment  See Epic Evaluation- Start of Care Date: 10/21/24

## 2024-11-05 ENCOUNTER — THERAPY VISIT (OUTPATIENT)
Dept: PHYSICAL THERAPY | Facility: CLINIC | Age: 69
End: 2024-11-05
Attending: NURSE PRACTITIONER
Payer: MEDICARE

## 2024-11-05 DIAGNOSIS — M17.11 PRIMARY OSTEOARTHRITIS OF RIGHT KNEE: Primary | ICD-10-CM

## 2024-11-05 PROCEDURE — 97112 NEUROMUSCULAR REEDUCATION: CPT | Mod: GP | Performed by: PHYSICAL THERAPIST

## 2024-11-05 PROCEDURE — 97110 THERAPEUTIC EXERCISES: CPT | Mod: GP | Performed by: PHYSICAL THERAPIST

## 2024-11-06 NOTE — TELEPHONE ENCOUNTER
Patient is now scheduled within the appropriate time frame of one month for urgent triaged referrals. No rescheduling is needed anymore.    Scheduled with Gastroenterology (NENO HERNANDEZ CNP)  12/06/2024 at 9:30 AM     Closing encounter.      Yuli Prescott, [facility]

## 2024-11-25 ENCOUNTER — MYC MEDICAL ADVICE (OUTPATIENT)
Dept: GASTROENTEROLOGY | Facility: CLINIC | Age: 69
End: 2024-11-25
Payer: MEDICARE

## 2024-11-26 ENCOUNTER — THERAPY VISIT (OUTPATIENT)
Dept: PHYSICAL THERAPY | Facility: CLINIC | Age: 69
End: 2024-11-26
Attending: NURSE PRACTITIONER
Payer: MEDICARE

## 2024-11-26 DIAGNOSIS — M17.11 PRIMARY OSTEOARTHRITIS OF RIGHT KNEE: Primary | ICD-10-CM

## 2024-11-26 PROCEDURE — 97530 THERAPEUTIC ACTIVITIES: CPT | Mod: GP | Performed by: PHYSICAL THERAPIST

## 2024-11-26 PROCEDURE — 97110 THERAPEUTIC EXERCISES: CPT | Mod: GP | Performed by: PHYSICAL THERAPIST

## 2024-11-26 ASSESSMENT — ACTIVITIES OF DAILY LIVING (ADL)
HOW_WOULD_YOU_RATE_THE_CURRENT_FUNCTION_OF_YOUR_KNEE_DURING_YOUR_USUAL_DAILY_ACTIVITIES_ON_A_SCALE_FROM_0_TO_100_WITH_100_BEING_YOUR_LEVEL_OF_KNEE_FUNCTION_PRIOR_TO_YOUR_INJURY_AND_0_BEING_THE_INABILITY_TO_PERFORM_ANY_OF_YOUR_USUAL_DAILY_ACTIVITIES?: 50
HOW_WOULD_YOU_RATE_THE_OVERALL_FUNCTION_OF_YOUR_KNEE_DURING_YOUR_USUAL_DAILY_ACTIVITIES?: NEARLY NORMAL
AS_A_RESULT_OF_YOUR_KNEE_INJURY,_HOW_WOULD_YOU_RATE_YOUR_CURRENT_LEVEL_OF_DAILY_ACTIVITY?: NEARLY NORMAL
PAIN: THE SYMPTOM AFFECTS MY ACTIVITY SLIGHTLY
STAND: ACTIVITY IS SOMEWHAT DIFFICULT
WALK: ACTIVITY IS MINIMALLY DIFFICULT
LIMPING: I HAVE THE SYMPTOM BUT IT DOES NOT AFFECT MY ACTIVITY
KNEEL ON THE FRONT OF YOUR KNEE: ACTIVITY IS SOMEWHAT DIFFICULT
WEAKNESS: I HAVE THE SYMPTOM BUT IT DOES NOT AFFECT MY ACTIVITY
GO UP STAIRS: ACTIVITY IS SOMEWHAT DIFFICULT
RAW_SCORE: 50
SQUAT: ACTIVITY IS SOMEWHAT DIFFICULT
RISE FROM A CHAIR: ACTIVITY IS NOT DIFFICULT
KNEE_ACTIVITY_OF_DAILY_LIVING_SCORE: 71.43
STIFFNESS: I HAVE THE SYMPTOM BUT IT DOES NOT AFFECT MY ACTIVITY
KNEE_ACTIVITY_OF_DAILY_LIVING_SUM: 50
SIT WITH YOUR KNEE BENT: ACTIVITY IS MINIMALLY DIFFICULT
GO DOWN STAIRS: ACTIVITY IS FAIRLY DIFFICULT
GIVING WAY, BUCKLING OR SHIFTING OF KNEE: I HAVE THE SYMPTOM BUT IT DOES NOT AFFECT MY ACTIVITY
SWELLING: I HAVE THE SYMPTOM BUT IT DOES NOT AFFECT MY ACTIVITY

## 2025-02-11 ENCOUNTER — TELEPHONE (OUTPATIENT)
Dept: INTERNAL MEDICINE | Facility: CLINIC | Age: 70
End: 2025-02-11

## 2025-02-11 NOTE — LETTER
March 11, 2025      Gena Houston  [address] [address]                        To Gena Houston,    Your team at [facility] cares about your health. We have reviewed your chart and based on our findings; we are making the following recommendations to better manage your health.     You are in particular need of attention regarding the following:     PREVENTATIVE VISIT: Annual Medicare Wellness:Schedule an Annual Medicare Wellness Exam. Please call your [facility] to set up your appointment.    If you have already completed these items, please contact the clinic via phone or   [email or web address] so your care team can review and update your records. Thank you for   choosing [facility] for your healthcare needs. For any questions,   concerns, or to schedule an appointment please contact our clinic.    Healthy Regards,    Your [facility] Care Team

## 2025-02-11 NOTE — TELEPHONE ENCOUNTER
Patient Quality Outreach    Patient is due for the following:   Physical Annual Wellness Visit      Topic Date Due    COVID-19 Vaccine (5 - 2024-25 season) 09/01/2024       Action(s) Taken:   Schedule a Annual Wellness Visit    Type of outreach:    Sent [employer] message.    Questions for provider review:    None           Joi Hernandez

## 2025-02-27 ENCOUNTER — OFFICE VISIT (OUTPATIENT)
Dept: AUDIOLOGY | Facility: CLINIC | Age: 70
End: 2025-02-27
Payer: MEDICARE

## 2025-02-27 DIAGNOSIS — H90.3 BILATERAL SENSORINEURAL HEARING LOSS: Primary | ICD-10-CM

## 2025-02-27 NOTE — PROGRESS NOTES
AUDIOLOGY REPORT    BACKGROUND:  Self-referred, known history of left sided hearing loss likely since birth or early childhood per patient report. She uses a left Vikki hearing aid with good benefit. She moved to [place] in 2024 and is establishing hearing care here. She noted consistent tinnitus left ear, it does periodically go away. She denied problems with dizziness / balance. She did note a history of sinus congestion due to allergies, with can influence dizziness/balance.     RESULTS:  Otoscopy showed clear canals and visible landmarks both ears. Tympanograms showed normal eardrum movement both ears. Ipsi and contra acoutic reflexes present right and left ears. Pure tones showed normal hearing sensitivity 250-4k sloping to mild to moderates high frequency sensorineural hearing loss right ear and low normal precipitously sloping to mild to severe-profound sensorineural hearing loss 2k-8k left ear. Good SRT / PTA agreement. Good word understanding in quiet, 100% both ears. Stable hearing as compared to test 11/10/2023 at [facility] in [place], [place].     RECOMMENDATIONS/PLAN:  Return 1-2 years for hearing testing sooner if she notes changes in hearing or tinnitus.   Follow as needed for hearing aid programming and maintenance.         Jc Leon.  [place] Licensed Audiologist #[id number]

## 2025-03-11 NOTE — TELEPHONE ENCOUNTER
Patient Quality Outreach    Patient is due for the following:   Physical Annual Wellness Visit    Action(s) Taken:   Schedule a Annual Wellness Visit    Type of outreach:    Sent letter.    Questions for provider review:    None           Joi Hernandez

## 2025-03-24 ENCOUNTER — ANCILLARY ORDERS (OUTPATIENT)
Dept: MAMMOGRAPHY | Facility: CLINIC | Age: 70
End: 2025-03-24
Payer: MEDICARE

## 2025-03-24 DIAGNOSIS — Z12.31 VISIT FOR SCREENING MAMMOGRAM: Primary | ICD-10-CM

## 2025-04-16 ENCOUNTER — OFFICE VISIT (OUTPATIENT)
Dept: ORTHOPEDICS | Facility: CLINIC | Age: 70
End: 2025-04-16
Payer: MEDICARE

## 2025-04-16 VITALS — HEIGHT: 64 IN | BODY MASS INDEX: 38.33 KG/M2 | WEIGHT: 224.5 LBS

## 2025-04-16 DIAGNOSIS — M17.11 PRIMARY OSTEOARTHRITIS OF RIGHT KNEE: Primary | ICD-10-CM

## 2025-04-16 PROCEDURE — 20610 DRAIN/INJ JOINT/BURSA W/O US: CPT | Mod: RT | Performed by: ORTHOPAEDIC SURGERY

## 2025-04-16 RX ORDER — BUPIVACAINE HYDROCHLORIDE 5 MG/ML
3 INJECTION, SOLUTION EPIDURAL; INTRACAUDAL; PERINEURAL
Status: COMPLETED | OUTPATIENT
Start: 2025-04-16 | End: 2025-04-16

## 2025-04-16 RX ORDER — TRIAMCINOLONE ACETONIDE 40 MG/ML
40 INJECTION, SUSPENSION INTRA-ARTICULAR; INTRAMUSCULAR
Status: COMPLETED | OUTPATIENT
Start: 2025-04-16 | End: 2025-04-16

## 2025-04-16 RX ORDER — CARBOXYMETHYLCELLULOSE SODIUM 10 MG/ML
1 GEL OPHTHALMIC 3 TIMES DAILY
COMMUNITY

## 2025-04-16 RX ADMIN — BUPIVACAINE HYDROCHLORIDE 3 ML: 5 INJECTION, SOLUTION EPIDURAL; INTRACAUDAL; PERINEURAL at 16:43

## 2025-04-16 RX ADMIN — TRIAMCINOLONE ACETONIDE 40 MG: 40 INJECTION, SUSPENSION INTRA-ARTICULAR; INTRAMUSCULAR at 16:43

## 2025-04-16 NOTE — PROGRESS NOTES
Large Joint Injection/Arthocentesis: R knee joint    Date/Time: 4/16/2025 4:43 PM    Performed by: Emeka Akbar APRN CNP  Authorized by: Camron Morgan DO    Indications:  Pain  Needle Size:  25 G  Guidance: landmark guided    Approach:  Anterolateral  Location:  Knee      Medications:  40 mg triamcinolone 40 MG/ML; 3 mL BUPivacaine (PF) 0.5 %  Outcome:  Tolerated well, no immediate complications  Procedure discussed: discussed risks, benefits, and alternatives    Consent Given by:  Patient  Timeout: timeout called immediately prior to procedure    Prep: patient was prepped and draped in usual sterile fashion

## 2025-04-16 NOTE — LETTER
4/16/2025      Gena Houston  [address] [address]      Dear Colleague,    Thank you for referring your patient, Gena Houston, to the [facility]. Please see a copy of my visit note below.    Office Visit-Follow up    Chief Complaint: Gena Houston is a 69 year old female who is being seen for   Chief Complaint   Patient presents with     Right Knee - Follow Up     Last steroid injection provided ~3.5 months good relief. Would like to repeat steroid injection       History of Present Illness:   Today's visit:  At her last visit she underwent a intra-articular corticosteroid injection to her right knee as well as the recommendation for physical therapy.  She is doing quite well with the injection.  Very happy with that.  Would like to repeat it.  She is very apprehensive about proceeding with knee replacement due to her nickel allergy.    October 18, 2024 office visit:  Here for right knee.  Reports many years of pain, last year getting much worse. Progressive. Constant pain.  Medial sided. Non radiating.  The left side is mildly bothersome and currently not painful.  No specific injuries to the knee.  Treatments trieid: previous cortisone injection in distant past, previous Synvisc 1 injections, formal physical therapy in the past for the ankles that also worked on the legs and knees, tylenol, rest, activity modification. Despite this having constant progressive pain, waking her at night, reports unable to walk >1 block before pain limits activity, decreased other activities.      Epic reviewed.  Outside orthopedics July 18, 2024 underwent bilateral Synvisc 1 injections.     Social History     Occupational History     Not on file   Tobacco Use     Smoking status: Former     Types: Cigarettes     Smokeless tobacco: Never   Vaping Use     Vaping status: Never Used   Substance and Sexual Activity     Alcohol use: Not on file     Drug use: Not on file     Sexual  "activity: Not on file       REVIEW OF SYSTEMS  General: negative for, night sweats, dizziness, fatigue  Resp: No shortness of breath and no cough  CV: negative for chest pain, syncope or near-syncope  GI: negative for nausea, vomiting and diarrhea  : negative for dysuria and hematuria  Musculoskeletal: as above  Neurologic: negative for syncope   Hematologic: negative for bleeding disorder    Physical Exam:  Vitals: Ht 1.613 m (5' 3.5\")   Wt 101.8 kg (224 lb 8 oz)   BMI 39.14 kg/m    BMI= Body mass index is 39.14 kg/m .  Constitutional: healthy, alert and no acute distress   Psychiatric: mentation appears normal and affect normal/bright  NEURO: no focal deficits  RESP: Normal with easy respirations and no use of accessory muscles to breathe, no audible wheezing or retractions  CV: RLE: no edema           SKIN: No erythema, rashes, excoriation, or breakdown. No evidence of infection.   JOINT/EXTREMITIES:right knee: Full motion.  Patella tracks midline.  No evidence of infection.  No soft tissue or bursal swelling.  Some tenderness along medial joint line.  GAIT: not tested             Diagnostic Modalities:  None today.  Independent visualization of the images was performed.      Impression: right knee primary osteoarthritis     Plan:  All of the above pertinent physical exam and imaging modalities findings was reviewed with Gena.                                          INJECTION PROCEDURE:  The patient was counseled about an  injection, including discussion of risks (including infection), contents of the injection, rationale for performing the injection, and expected benefits of the injection. The skin was prepped with alcohol and betadine and then utilizing sterile technique an injection of the right knee joint from the anterolateral approach in the seated position was performed. The injection consisted 1ml of Kenalog (40mg per 1 ml) mixed with 3ml of 0.5% Marcaine. The patient tolerated the injection well, " and there were no complications. The injection site was covered with a Band-Aid. The injection was performed by Levi Morgan D.O.    Treatment options discussed.  Good relief with the injection we will repeat it today.  We did discuss knee replacement and metal allergies.  Likelihood of reaction with prosthesis is limited although we certainly could not guarantee that.  However she is getting good relief with the steroid injections plan to continue repeating that.      Return to clinic 6, week(s), PRN, or sooner as needed for changes.  Re-x-ray on return: No    Levi Morgan D.O.          Large Joint Injection/Arthocentesis: R knee joint    Date/Time: 4/16/2025 4:43 PM    Performed by: Emeka Akbar APRN CNP  Authorized by: Camron Morgan DO    Indications:  Pain  Needle Size:  25 G  Guidance: landmark guided    Approach:  Anterolateral  Location:  Knee      Medications:  40 mg triamcinolone 40 MG/ML; 3 mL BUPivacaine (PF) 0.5 %  Outcome:  Tolerated well, no immediate complications  Procedure discussed: discussed risks, benefits, and alternatives    Consent Given by:  Patient  Timeout: timeout called immediately prior to procedure    Prep: patient was prepped and draped in usual sterile fashion          Again, thank you for allowing me to participate in the care of your patient.        Sincerely,        Camron Morgan DO    Electronically signed

## 2025-04-16 NOTE — PROGRESS NOTES
Office Visit-Follow up    Chief Complaint: Gena Houston is a 69 year old female who is being seen for   Chief Complaint   Patient presents with    Right Knee - Follow Up     Last steroid injection provided ~3.5 months good relief. Would like to repeat steroid injection       History of Present Illness:   Today's visit:  At her last visit she underwent a intra-articular corticosteroid injection to her right knee as well as the recommendation for physical therapy.  She is doing quite well with the injection.  Very happy with that.  Would like to repeat it.  She is very apprehensive about proceeding with knee replacement due to her nickel allergy.    October 18, 2024 office visit:  Here for right knee.  Reports many years of pain, last year getting much worse. Progressive. Constant pain.  Medial sided. Non radiating.  The left side is mildly bothersome and currently not painful.  No specific injuries to the knee.  Treatments trieid: previous cortisone injection in distant past, previous Synvisc 1 injections, formal physical therapy in the past for the ankles that also worked on the legs and knees, tylenol, rest, activity modification. Despite this having constant progressive pain, waking her at night, reports unable to walk >1 block before pain limits activity, decreased other activities.      Epic reviewed.  Outside orthopedics July 18, 2024 underwent bilateral Synvisc 1 injections.     Social History     Occupational History    Not on file   Tobacco Use    Smoking status: Former     Types: Cigarettes    Smokeless tobacco: Never   Vaping Use    Vaping status: Never Used   Substance and Sexual Activity    Alcohol use: Not on file    Drug use: Not on file    Sexual activity: Not on file       REVIEW OF SYSTEMS  General: negative for, night sweats, dizziness, fatigue  Resp: No shortness of breath and no cough  CV: negative for chest pain, syncope or near-syncope  GI: negative for nausea, vomiting and diarrhea  :  "negative for dysuria and hematuria  Musculoskeletal: as above  Neurologic: negative for syncope   Hematologic: negative for bleeding disorder    Physical Exam:  Vitals: Ht 1.613 m (5' 3.5\")   Wt 101.8 kg (224 lb 8 oz)   BMI 39.14 kg/m    BMI= Body mass index is 39.14 kg/m .  Constitutional: healthy, alert and no acute distress   Psychiatric: mentation appears normal and affect normal/bright  NEURO: no focal deficits  RESP: Normal with easy respirations and no use of accessory muscles to breathe, no audible wheezing or retractions  CV: RLE: no edema           SKIN: No erythema, rashes, excoriation, or breakdown. No evidence of infection.   JOINT/EXTREMITIES:right knee: Full motion.  Patella tracks midline.  No evidence of infection.  No soft tissue or bursal swelling.  Some tenderness along medial joint line.  GAIT: not tested             Diagnostic Modalities:  None today.  Independent visualization of the images was performed.      Impression: right knee primary osteoarthritis     Plan:  All of the above pertinent physical exam and imaging modalities findings was reviewed with Gena.                                          INJECTION PROCEDURE:  The patient was counseled about an  injection, including discussion of risks (including infection), contents of the injection, rationale for performing the injection, and expected benefits of the injection. The skin was prepped with alcohol and betadine and then utilizing sterile technique an injection of the right knee joint from the anterolateral approach in the seated position was performed. The injection consisted 1ml of Kenalog (40mg per 1 ml) mixed with 3ml of 0.5% Marcaine. The patient tolerated the injection well, and there were no complications. The injection site was covered with a Band-Aid. The injection was performed by Levi Morgan D.O.    Treatment options discussed.  Good relief with the injection we will repeat it today.  We did discuss knee replacement and " metal allergies.  Likelihood of reaction with prosthesis is limited although we certainly could not guarantee that.  However she is getting good relief with the steroid injections plan to continue repeating that.      Return to clinic 6, week(s), PRN, or sooner as needed for changes.  Re-x-ray on return: No    Levi Morgan D.O.

## 2025-05-12 NOTE — TELEPHONE ENCOUNTER
Patient Quality Outreach    Patient is due for the following:   Diabetes -  LDL (Fasting)  Physical Annual Wellness Visit    Action(s) Taken:   Schedule a Annual Wellness Visit  Patient has upcoming appointment, these items will be addressed at that time.    Type of outreach:    Patient has scheduled office visit since last outreach attempt.    Questions for provider review:    None         Joi Hernandez  Chart routed to None.

## 2025-05-20 ENCOUNTER — OFFICE VISIT (OUTPATIENT)
Dept: INTERNAL MEDICINE | Facility: CLINIC | Age: 70
End: 2025-05-20
Payer: MEDICARE

## 2025-05-20 VITALS
OXYGEN SATURATION: 97 % | BODY MASS INDEX: 40.41 KG/M2 | DIASTOLIC BLOOD PRESSURE: 68 MMHG | TEMPERATURE: 97 F | WEIGHT: 219.6 LBS | RESPIRATION RATE: 20 BRPM | HEART RATE: 89 BPM | HEIGHT: 62 IN | SYSTOLIC BLOOD PRESSURE: 126 MMHG

## 2025-05-20 DIAGNOSIS — G44.219 EPISODIC TENSION-TYPE HEADACHE, NOT INTRACTABLE: ICD-10-CM

## 2025-05-20 DIAGNOSIS — L98.9 SKIN LESION: ICD-10-CM

## 2025-05-20 DIAGNOSIS — E66.01 OBESITY, MORBID, BMI 40.0-49.9 (H): ICD-10-CM

## 2025-05-20 DIAGNOSIS — R09.82 POST-NASAL DRIP: ICD-10-CM

## 2025-05-20 DIAGNOSIS — Z13.6 SCREENING FOR CARDIOVASCULAR CONDITION: ICD-10-CM

## 2025-05-20 DIAGNOSIS — E78.5 HYPERLIPIDEMIA LDL GOAL <100: ICD-10-CM

## 2025-05-20 DIAGNOSIS — Z13.1 SCREENING FOR DIABETES MELLITUS: ICD-10-CM

## 2025-05-20 DIAGNOSIS — J30.1 SEASONAL ALLERGIC RHINITIS DUE TO POLLEN: ICD-10-CM

## 2025-05-20 DIAGNOSIS — R60.0 FLUID RETENTION IN LEGS: ICD-10-CM

## 2025-05-20 DIAGNOSIS — Z87.891 HISTORY OF TOBACCO USE, PRESENTING HAZARDS TO HEALTH: ICD-10-CM

## 2025-05-20 DIAGNOSIS — I10 ESSENTIAL HYPERTENSION: Primary | ICD-10-CM

## 2025-05-20 LAB
ALBUMIN SERPL BCG-MCNC: 4.3 G/DL (ref 3.5–5.2)
ALP SERPL-CCNC: 96 U/L (ref 40–150)
ALT SERPL W P-5'-P-CCNC: 21 U/L (ref 0–50)
ANION GAP SERPL CALCULATED.3IONS-SCNC: 10 MMOL/L (ref 7–15)
AST SERPL W P-5'-P-CCNC: 17 U/L (ref 0–45)
BILIRUB SERPL-MCNC: 0.7 MG/DL
BUN SERPL-MCNC: 22.6 MG/DL (ref 8–23)
CALCIUM SERPL-MCNC: 10.2 MG/DL (ref 8.8–10.4)
CHLORIDE SERPL-SCNC: 99 MMOL/L (ref 98–107)
CHOLEST SERPL-MCNC: 139 MG/DL
CREAT SERPL-MCNC: 0.96 MG/DL (ref 0.51–0.95)
CREAT UR-MCNC: 27 MG/DL
EGFRCR SERPLBLD CKD-EPI 2021: 64 ML/MIN/1.73M2
FASTING STATUS PATIENT QL REPORTED: YES
FASTING STATUS PATIENT QL REPORTED: YES
GLUCOSE SERPL-MCNC: 104 MG/DL (ref 70–99)
HCO3 SERPL-SCNC: 32 MMOL/L (ref 22–29)
HDLC SERPL-MCNC: 51 MG/DL
LDLC SERPL CALC-MCNC: 68 MG/DL
MICROALBUMIN UR-MCNC: <12 MG/L
MICROALBUMIN/CREAT UR: NORMAL MG/G{CREAT}
NONHDLC SERPL-MCNC: 88 MG/DL
POTASSIUM SERPL-SCNC: 4 MMOL/L (ref 3.4–5.3)
PROT SERPL-MCNC: 7.8 G/DL (ref 6.4–8.3)
SODIUM SERPL-SCNC: 141 MMOL/L (ref 135–145)
TRIGL SERPL-MCNC: 98 MG/DL

## 2025-05-20 PROCEDURE — 82043 UR ALBUMIN QUANTITATIVE: CPT | Performed by: INTERNAL MEDICINE

## 2025-05-20 PROCEDURE — G2211 COMPLEX E/M VISIT ADD ON: HCPCS | Performed by: INTERNAL MEDICINE

## 2025-05-20 PROCEDURE — 80053 COMPREHEN METABOLIC PANEL: CPT | Performed by: INTERNAL MEDICINE

## 2025-05-20 PROCEDURE — 36415 COLL VENOUS BLD VENIPUNCTURE: CPT | Performed by: INTERNAL MEDICINE

## 2025-05-20 PROCEDURE — 3074F SYST BP LT 130 MM HG: CPT | Performed by: INTERNAL MEDICINE

## 2025-05-20 PROCEDURE — 80061 LIPID PANEL: CPT | Performed by: INTERNAL MEDICINE

## 2025-05-20 PROCEDURE — 3078F DIAST BP <80 MM HG: CPT | Performed by: INTERNAL MEDICINE

## 2025-05-20 PROCEDURE — 1125F AMNT PAIN NOTED PAIN PRSNT: CPT | Performed by: INTERNAL MEDICINE

## 2025-05-20 PROCEDURE — 99215 OFFICE O/P EST HI 40 MIN: CPT | Performed by: INTERNAL MEDICINE

## 2025-05-20 PROCEDURE — 82570 ASSAY OF URINE CREATININE: CPT | Performed by: INTERNAL MEDICINE

## 2025-05-20 RX ORDER — ROSUVASTATIN CALCIUM 5 MG/1
5 TABLET, COATED ORAL DAILY
Qty: 90 TABLET | Refills: 3 | Status: SHIPPED | OUTPATIENT
Start: 2025-05-20

## 2025-05-20 RX ORDER — POTASSIUM CHLORIDE 1500 MG/1
20 TABLET, EXTENDED RELEASE ORAL DAILY
Qty: 90 TABLET | Refills: 3 | Status: SHIPPED | OUTPATIENT
Start: 2025-05-20

## 2025-05-20 RX ORDER — FLUTICASONE PROPIONATE 50 MCG
1 SPRAY, SUSPENSION (ML) NASAL DAILY
Qty: 16 G | Refills: 3 | Status: SHIPPED | OUTPATIENT
Start: 2025-05-20 | End: 2025-05-20

## 2025-05-20 RX ORDER — TELMISARTAN AND HYDROCHLORTHIAZIDE 80; 25 MG/1; MG/1
1 TABLET ORAL DAILY
Qty: 90 TABLET | Refills: 3 | Status: SHIPPED | OUTPATIENT
Start: 2025-05-20

## 2025-05-20 RX ORDER — FUROSEMIDE 40 MG/1
40 TABLET ORAL DAILY
Qty: 90 TABLET | Refills: 3 | Status: SHIPPED | OUTPATIENT
Start: 2025-05-20

## 2025-05-20 ASSESSMENT — PAIN SCALES - GENERAL: PAINLEVEL_OUTOF10: MODERATE PAIN (5)

## 2025-05-20 NOTE — PROGRESS NOTES
Assessment & Plan   Problem List Items Addressed This Visit       Obesity, morbid, BMI 40.0-49.9 (H)     Other Visit Diagnoses         Essential hypertension    -  Primary    Relevant Medications    furosemide (LASIX) 40 MG tablet    potassium chloride ryan ER (KLOR-CON M20) 20 MEQ CR tablet    telmisartan-HCTZ (MICARDIS HCT) 80-25 MG tablet    Other Relevant Orders    Lipid panel reflex to direct LDL Fasting (Completed)    Comprehensive metabolic panel (BMP + Alb, Alk Phos, ALT, AST, Total. Bili, TP) (Completed)    Albumin Random Urine Quantitative with Creat Ratio (Completed)      History of tobacco use, presenting hazards to health        Relevant Orders    CT Chest Lung Cancer Screen Low Dose Without      Screening for cardiovascular condition          Screening for diabetes mellitus          Skin lesion        Relevant Orders    Adult Dermatology  Referral      Hyperlipidemia LDL goal <100        Relevant Medications    rosuvastatin (CRESTOR) 5 MG tablet    Other Relevant Orders    Comprehensive metabolic panel (BMP + Alb, Alk Phos, ALT, AST, Total. Bili, TP) (Completed)      Post-nasal drip        Relevant Orders    Adult Allergy/Asthma  Referral      Episodic tension-type headache, not intractable          Fluid retention in legs          Seasonal allergic rhinitis due to pollen        Relevant Orders    Adult Allergy/Asthma  Referral           Patient is here with multiple concerns and questions today.    1.  History of tobacco use had a screening CT scan and needs to repeat this after 6 months due to his lung nodules and that is ordered.    2.  Some skin concerns and we will refer to dermatology  3.  She is on hyperlipidemia has Crestor we will check lipids and LFTs.    4.  Lots of allergies to medications, environmental things and nickel.  She has questions about a luminal and other reactions to zinc.  I will refer her to allergy for full evaluation and possible allergy  "shots.  5.  Headaches and not feeling well I think this is due to seasonal allergies with the congestion and hopefully that can help her.    Concern about her heart we did review her stress test and then cardiac PET scan done and Angi helpful both of which were normal with no signs of ischemia.    6.  Fluid retention in her legs she has not been on Lasix for a long time and will continue this.    7.  Hypertension blood pressure is okay continue telmisartan hydrochlorothiazide, potassium because of that and because of Lasix.  Medications are refilled for her and we have taken over her refills.    8.  Swallowing issues in her sister but not necessarily in her some congestion possibly from postnasal drip and again would recommend she see allergy to try to treat this which may help her headaches and swallowing issues.    9..Eyelids are droopy.  She may need to see ophthalmology and have an eyelid blepharoplasty because the upper eyebrow is drooping into her vision.    The longitudinal plan of care for the diagnosis(es)/condition(s) as documented were addressed during this visit. Due to the added complexity in care, I will continue to support Gena in the subsequent management and with ongoing continuity of care.    Extra time spent with the patient greater than 45 minutes with the patient today reviewing her past medical, current issues, lab results and planning for the future.  I do recommend she follow-up for a Medicare wellness exam.     BMI  Estimated body mass index is 39.65 kg/m  as calculated from the following:    Height as of this encounter: 1.585 m (5' 2.4\").    Weight as of this encounter: 99.6 kg (219 lb 9.6 oz).   Weight management plan: Discussed healthy diet and exercise guidelines    Follow-up for Medicare wellness exam      Subjective   Gena is a 69 year old, presenting for the following health issues:  Recheck Medication      5/20/2025     9:05 AM   Additional Questions   Roomed by Emily Roper " "    History of Present Illness       Hyperlipidemia:  She presents for follow up of hyperlipidemia.   She is taking medication to lower cholesterol. She is having myalgia or other side effects to statin medications.    Hypertension: She presents for follow up of hypertension.  She does not check blood pressure  regularly outside of the clinic. Outpatient blood pressures have not been over 140/90. She follows a low salt diet.     Reason for visit:  Review medications, labs as needed, general followup from previous visit    She eats 2-3 servings of fruits and vegetables daily.She consumes 1 sweetened beverage(s) daily.She exercises with enough effort to increase her heart rate 20 to 29 minutes per day.  She exercises with enough effort to increase her heart rate 4 days per week.   She is taking medications regularly.      Doing ok, BP is stable at home. Weight is down 5 pounds.     Allergies with pollens and foods, get sinus congestion.    Sees GI and has mild fatty liver.    Wants to see dermatology for skintags and rash.     Quit smoking in 2011 and needs ct screening    Edema on lasix, wears compression stockings.     Mammogram scheduled     Colonoscopy has been done.     Sister with swallowing problems.  Patient is doing ok, some issues. Headaches can be related to sinuses come and go.     Eyelids droop at times, worse later in the day.     Stress echo and PET of the heart scan was ok.           Objective    /68   Pulse 89   Temp 97  F (36.1  C) (Temporal)   Resp 20   Ht 1.585 m (5' 2.4\")   Wt 99.6 kg (219 lb 9.6 oz)   SpO2 97%   BMI 39.65 kg/m    Body mass index is 39.65 kg/m .  Physical Exam   GENERAL: alert and no distress  NECK: no adenopathy, no asymmetry, masses, or scars  RESP: lungs clear to auscultation - no rales, rhonchi or wheezes  CV: regular rate and rhythm, normal S1 S2, no S3 or S4, no murmur, click or rub, no peripheral edema  ABDOMEN: soft, nontender, no hepatosplenomegaly, no masses " and bowel sounds normal  MS: no gross musculoskeletal defects noted, no edema        Signed Electronically by: Ruben Rodrigez MD

## 2025-05-21 ENCOUNTER — PATIENT OUTREACH (OUTPATIENT)
Dept: CARE COORDINATION | Facility: CLINIC | Age: 70
End: 2025-05-21
Payer: MEDICARE

## 2025-05-26 ENCOUNTER — PATIENT OUTREACH (OUTPATIENT)
Dept: CARE COORDINATION | Facility: CLINIC | Age: 70
End: 2025-05-26
Payer: MEDICARE

## 2025-05-28 ENCOUNTER — PATIENT OUTREACH (OUTPATIENT)
Dept: CARE COORDINATION | Facility: CLINIC | Age: 70
End: 2025-05-28
Payer: MEDICARE

## 2025-06-08 ENCOUNTER — HEALTH MAINTENANCE LETTER (OUTPATIENT)
Age: 70
End: 2025-06-08

## 2025-06-08 NOTE — PROGRESS NOTES
"  [address] [address]  Phone: [phone]    Patient:  Gena Houston, Date of birth 1955    Referring Provider: Elia Milner Paul D      Gastroenterology CLINIC VISIT, RETURN PATIENT    REASON FOR CONSULTATION: follow up    HPI: 69 year old female presented to GI clinic for a follow up. PMH of cholecystectomy, HTN, bilat. hearing loss, hepatic steatosis, and colonic diverticulosis. The patient was seen for BRBPR and alternation of diarrhea and constipation X several months.   CT scan of abdomen which was suspicious for possible enteritis. Found to have diverticulosis with no signs of diverticulitis. Underwent colonoscopy that came back unremarkable.    Patient stated that most of her symptoms had subsided or resolved. Takes 1000 mg of Citrucel in the morning. Her bowel elimination is back to normal. Having 1-2 formed stools a day. At times, notices small amount of BRBPR on tissues, hx of hemorrhoids. No rectal pain. No black stools.  Reported rare acid reflux episodes, takes Tums as needed. No nausea or vomiting. Working on weight loss.    Patient pointed on scattered pink macules on her neck, wondering about possible allergic reaction to Citrucel.   Patient stated that her and her family are known for having multiple food sensitivities and allergies. Said that she cannot eat \"bagged salads\" because it would cause loose stools. Exposure to mold makes her sick. At times, she feels bloated after consumption of wheat or corn. She recalls having skin prick tests about 20 years ago and was told that she has multiple environmental allergies.stated that most of her sensitivity reactions are manifested by sensation of congestion, dyspepsia, and swelling.  Denies history of anaphylactic reactions.  No family hx of celiac disease. Stated that she avoids foods that have preservatives (canned foods, pre-washed veggies, processed foods).  Scheduled " to see an allergy specialist in September.    Wt Readings from Last 10 Encounters:   06/09/25 100.2 kg (221 lb)   05/20/25 99.6 kg (219 lb 9.6 oz)   04/16/25 101.8 kg (224 lb 8 oz)   12/06/24 103.2 kg (227 lb 8 oz)   10/18/24 104.6 kg (230 lb 8 oz)   10/14/24 103.2 kg (227 lb 9.6 oz)   09/24/24 102.1 kg (225 lb)       PREVIOUS ENDOSCOPY:  4/3/2024  Colonoscopy at [facility] was performed, which was normal. The colonoscope was passed into the rectum and advanced all the way to the cecum. The ileocecal valve and appendiceal orifice were identified and photographed to confirm advancement to the cecum. The colonoscope was slowly withdrawn.  No pathology was found. The colonoscope was retroflexed in the rectum and the anal canal appeared to be normal. Rectal biopsies obtained with cold forceps.      FINAL DIAGNOSIS     A. RECTUM, BIOPSY:  -Rectal mucosa with no significant abnormality         PERTINENT STUDIES Reviewed in EMR  2/26/2024 CT abdomen/pelvis  CT ABDOMEN:   Lung Bases:  Lung bases are grossly clear.   Liver:      Liver with diffuse low-attenuation of the liver parenchyma without suspicious liver lesions.   Gallbladder:  Cholecystectomy   Biliary ducts:  No significant change in the biliary ductal system compared to previous exam..   Pancreas:    Pancreas is grossly unremarkable.   Stomach:    Stomach is not fully distended. Mild gastric fold thickening and circumferential thickening of the distal esophagus appears essentially similar.   Spleen:    Spleen is  unremarkable. Appearance of medial and inferior aspect of the spleen is essentially similar with lobulation   Small bowel:  Small bowel loops are overall within normal limits in diameter without evidence for obstruction. However, short segment of jejunum proximally beyond the ligament of Treitz of almost 2 cm segment with a small posterior calcification (noncontrast axial image 30 and in the postcontrast axial image 33/107 with adjacent mural  fat attenuated density (axial image 27/107 precontrast axial image and 29/107 post contrast axial image) without proximal obstruction changes.   Abdominal colon:Colon is grossly unremarkable with scattered few diverticula of the sigmoid without evidence for acute appearing changes. However, contrast has not extended into the left side and the rectosigmoid without distention slightly limits the evaluation. No acute changes. Small appendix appear normal.   Abdominal aorta:  Moderate prominently calcific atherosclerotic vascular disease of the abdominal aorta extending into iliac vessels.   Adrenal glands:  No adrenal masses but left adrenal hyperplasia.   Kidneys:    Both kidneys enhances symmetrically. No evidence to suggest obstruction and right lower pole intrarenal nonobstructing papillary calcification appears slightly increased but not significantly changed measuring up to 6.5 mm maximally   Retroperitoneum/ Mesentry:No retroperitoneal or mesenteric root adenopathy except a few normal size lymph nodes and not significantly changed compared to 2020 exam.   CT Pelvis:   Urinary bladder:  Bladder is not fully distended but no acute changes.   Pelvic bowel:  Pelvic bowel with tortuosity. Segments of rectum and sigmoid not distended especially the segment of the proximal rectum with mild wall thickening in this patient with history of proctitis by CT is limited in evaluation . In fact portion of the distal rectum is slightly distended with air on this exam without significant mass like regions. No free air or free fluid.   Uterus:    Hysterectomy   Abdominal wall:  No abdominal wall masses.   Bony structures:  Degenerative disc disease at the lower thoracic levels and especially at L5-S1 the disc osteophyte complex encroaching into left and neural foramina and facet joint arthritis on the left. Bilateral SI joint with mild vacuum phenomenon.     IMPRESSION:   1. Nondistention of the rectosigmoid with absence of  enteric contrast into this segment. But mild wall thickening of the rectosigmoid junction and proximal rectum. Overall improved appearance of distal rectum compared to previous exam without acute appearing changes or obvious other significant findings to implicate etiology of change in bowel habit.   2. Most of the colon up to mid left side is distended and contrast-filled without remarkable findings. Mild diverticulosis of the left  distal colon and the sigmoid without evidence for acute appearing changes.   2. Segment of proximal jejunum of approximately 2 cm with posterior wall calcification and small possible jejunal wall lipoma appear benign.   4. At least mild hepatic steatosis and mild left adrenal hyperplasia unchanged. Mild circumferential thickening of the distal esophagus filled with air is also not significantly changed in pattern.   5. Nominal increased size of nonobstructing right lower pole intrarenal calculus Moderate disc disease at L5-S1 asymmetric to the left with the disc osteophyte complex to some extent encroaching into neural foramina.       ROS: 10pt ROS performed and otherwise negative.    PAST MEDICAL HISTORY:  No past medical history on file.    PREVIOUS ABDOMINAL/GYNECOLOGIC SURGERIES:  No past surgical history on file.      PERTINENT MEDICATIONS:  Current Outpatient Medications   Medication Sig Dispense Refill    aspirin (ASA) 81 MG chewable tablet Take 1 tablet (81 mg) by mouth daily.      calcium carbonate (OS-FRANCISCO) 500 MG tablet Take 1 tablet (500 mg) by mouth 2 times daily.      carboxymethylcellulose PF (REFRESH LIQUIGEL) 1 % ophthalmic gel 1 drop 3 times daily.      celecoxib (CELEBREX) 200 MG capsule Take 1 capsule (200 mg) by mouth daily.      conjugated estrogens (PREMARIN) 0.625 MG/GM vaginal cream Place 0.5 g vaginally twice a week.      furosemide (LASIX) 40 MG tablet Take 1 tablet (40 mg) by mouth daily. 90 tablet 3    methylcellulose (CITRUCEL) 500 MG TABS tablet Take 2  tablets (1,000 mg) by mouth daily. 60 tablet 11    Omega-3 Fish Oil 500 MG capsule Take 1 capsule (500 mg) by mouth daily.      potassium chloride ryan ER (KLOR-CON M20) 20 MEQ CR tablet Take 1 tablet (20 mEq) by mouth daily. 90 tablet 3    rosuvastatin (CRESTOR) 5 MG tablet Take 1 tablet (5 mg) by mouth daily. 90 tablet 3    telmisartan-HCTZ (MICARDIS HCT) 80-25 MG tablet Take 1 tablet by mouth daily. 90 tablet 3    vitamin D3 (CHOLECALCIFEROL) 50 mcg (2000 units) tablet Take 1 tablet (50 mcg) by mouth 2 times daily.           SOCIAL HISTORY:  Social History     Socioeconomic History    Marital status:      Spouse name: Not on file    Number of children: Not on file    Years of education: Not on file    Highest education level: Not on file   Occupational History    Not on file   Tobacco Use    Smoking status: Former     Average packs/day: 1 pack/day for 38.0 years (38.0 ttl pk-yrs)     Types: Cigarettes     Start date: 01/1973    Smokeless tobacco: Never   Vaping Use    Vaping status: Never Used   Substance and Sexual Activity    Alcohol use: Not on file    Drug use: Not on file    Sexual activity: Not on file   Other Topics Concern    Not on file   Social History Narrative    Not on file     Social Drivers of Health     Financial Resource Strain: Not on file   Food Insecurity: Not on file   Transportation Needs: No Transportation Needs (4/1/2021)    Received from [facility] and [facility]    PRAPARE - Transportation     Lack of Transportation (Medical): No     Lack of Transportation (Non-Medical): No   Physical Activity: Insufficiently Active (4/1/2021)    Received from [facility] [employer] [facility]    Exercise Vital Sign     Days of Exercise per Week: 3 days     Minutes of Exercise per Session: 40 min   Stress: No Stress Concern Present (4/1/2021)    Received from [facility] and [facility]    [place] [employer] - Occupational Stress Questionnaire     Feeling of Stress : Only a  "little   Social Connections: Socially Integrated (4/1/2021)    Received from [facility]    Social Connection and Isolation Panel [NHANES]     Frequency of Communication with Friends and Family: Three times a week     Frequency of Social Gatherings with Friends and Family: Once a week     Attends Sabianist Services: 1 to 4 times per year     Active Member of Clubs or Organizations: Yes     Attends Club or Organization Meetings: More than 4 times per year     Marital Status:    Interpersonal Safety: Low Risk  (5/20/2025)    Interpersonal Safety     Do you feel physically and emotionally safe where you currently live?: Yes     Within the past 12 months, have you been hit, slapped, kicked or otherwise physically hurt by someone?: No     Within the past 12 months, have you been humiliated or emotionally abused in other ways by your partner or ex-partner?: No   Housing Stability: Not on file       FAMILY HISTORY:  Denies colon/panc/esophageal/other GI CA, no other [place] or other HPS-related Ne. No IBD/celiac, no other AI/liver/thyroid disease.    No family history on file.    PHYSICAL EXAMINATION:  Vitals reviewed  /64 (BP Location: Right arm, Patient Position: Sitting, Cuff Size: Adult Large)   Pulse 80   Ht 1.585 m (5' 2.4\")   Wt 100.2 kg (221 lb)   Breastfeeding No   BMI 39.90 kg/m      General: Patient appears well in no acute distress.    Skin: No visualized rash or lesions on visualized skin  HEENT:    EOMI, no erythema, sclera icterus or discharge noted.  Mouth mucosa intact, pink, moist  No cervical or supraclavicular lymphadenopathy. Thyroid gland not enlarged.  Resp: breathing comfortably without accessory muscle usage, speaking in full sentences, no cough. Lung sounds clear  Card: Regular and rhythmic S1 and S2. No gallop or rub. No murmur.  No LE edema.  Abdomen: Active bowel sounds X 4 quadrants. Soft to palpation.  No guarding or rebound tenderness. Kenyon's sign " negative.  MSK: Appears to have normal range of motion based on visualized movements  Neurologic: No apparent tremors, facial movements symmetric  Psych: affect normal, alert and oriented      ASSESSMENT/PLAN:    ICD-10-CM    1. Diverticular disease of colon  K57.30 methylcellulose (CITRUCEL) 500 MG TABS tablet      2. Tortuous colon  Q43.8       3. Hepatic steatosis  K76.0          69 year old female  presented to GI clinic for a follow up.  Was seen for episodes of BRBPR, abdominal discomfort, and irregular stool pattern of alternation between diarrhea and constipation.  Underwent CT of abdomen/pelvis and colonoscopy in February and April 2024.  Noted questionable mild colitis on CT and a comment on tortuous colon.  There was a segment of proximal jejunum of approximately 2 cm with posterior wall calcification and small possible jejunal wall lipoma that appeared benign.  Noted sigmoid diverticulosis was no signs of diverticulitis.  Incidental finding of hepatic steatosis.  Patient had unremarkable colonoscopy.  Was started on Citrucel. Reported resolution of majority of her symptoms. Has BM every day, formed stools. No abdominal pain. No bloating. Occasionally, experiences nausea in the morning. Noticing BRBPR at times. Pt is aware of available OTC anti hemorrhoidal creams/suppository to use as needed. Prefers to watch her diet to prevent flare ups of hemorrhoids.  Suggested to continue Citrucel.  Planning to see dermatology in July and allergist in September for intermittent rashes and sensitivity to environmental agents and food additives.    Patient verbalized understanding and appreciation of care provided. Stated that all of the questions were answered to her/his satisfaction.  RTC in one year or as needed    Thank you for this consultation. It was a pleasure to participate in the care of this patient; please contact us with any further questions.    NENO Milner, FNP-C  Division of  Gastroenterology  [facility]  [facility], [place], [place]    This note was created with Dragon voice recognition software, and while reviewed for accuracy, inadvertent minor typographic errors may occur. Please contact the provider if you have any questions.

## 2025-06-08 NOTE — PATIENT INSTRUCTIONS
It was a pleasure taking care of you today.  I've included a brief summary of our discussion and care plan from today's visit below.  Please review this information with your primary care provider.  ______________________________________________________________________    My recommendations are summarized as follows:    As we discussed today, please continue [facility].     2. Below, I placed more information on diverticulosis and fatty liver disease. Please review at your convenience.      Return to GI Clinic in one year to review your progress.    ______________________________________________________________________  DIVERTICULOSIS  A diverticulum is a pouch-like structure that can form through points of weakness in the muscular wall of the colon (ie, at points where blood vessels pass through the wall).   Diverticulosis merely describes the presence of diverticula. Diverticulosis is often found during a test done for other reasons, such as flexible sigmoidoscopy, colonoscopy, or barium enema. Most people with diverticulosis have no symptoms and will remain symptom free for the rest of their lives.     People with diverticulosis who do not have symptoms do not require treatment. However, most clinicians recommend increasing fiber in the diet, which can help to bulk the stools and possibly prevent the development of new diverticula, diverticulitis, or diverticular bleeding. Fiber is not proven to prevent these conditions in all patients but may help to control recurrent episodes in some.   Fruits and vegetables are a good source of fiber. Patients with diverticular disease have historically been advised to avoid whole pieces of fiber (such as seeds, corn, and nuts) because of concern that these foods could cause an episode of diverticulitis. However, this belief is completely unproven. We do not suggest that patients with diverticulosis avoid seeds, corn, or nuts.     Approximately 15 to 25 percent of people with  "diverticulosis may develop diverticulitis, which is inflammation of diverticulum.  This may be caused by increased pressure within the colon or by hardened particles of stool, which can become lodged within the diverticulum. The symptoms of diverticulitis depend upon the degree of inflammation present. The most common symptom is pain in the left lower abdomen. Other symptoms can include nausea and vomiting, constipation, diarrhea, and urinary symptoms such as pain or burning when urinating or the frequent need to urinate.   If your disease is mild and you are otherwise healthy, you might be treated at home. This usually involves a liquid diet and pain-relieving medication. However, if you develop one or more of the following signs or symptoms, you should seek immediate medical attention:  ?Temperature >100.1 F (38 C)  ?Worsening or severe abdominal pain  ?Inability to tolerate fluids       What is metabolic dysfunction-associated steatotic liver disease?   Metabolic dysfunction-associated steatotic liver disease, or \"MASLD,\" is a condition in which fat builds up in the liver. The liver is a big organ in the upper right side of the belly. MASLD used to be called \"nonalcoholic fatty liver disease.\"  When the liver has fat buildup and is inflamed, the condition is called \"metabolic dysfunction-associated steatohepatitis,\" or \"MASH.\" MASH used to be called \"nonalcoholic steatohepatitis.\"  This article is mostly about MASH, because this condition can lead to the most problems.  What causes MASLD and MASH?   Doctors do not know exactly. They do know that it happens more often in some people, such as those who have:  ?Excess body weight  ?Diabetes, which causes blood sugar levels to get too high  ?High cholesterol  ?High blood pressure  What are the symptoms of MASH? (you do not have MASH)  Most people with MASH (when there is liver inflammation) have no symptoms. Some people feel tired or unwell, or have discomfort in the " upper belly.  Your doctor or nurse might suspect that you have MASH based on the results of your routine blood tests.  Will I need more tests?   Yes. If your doctor or nurse suspects that you have MASH, you will likely have:  ?More blood tests  ?An imaging test of the liver - This might be an ultrasound or MRI scan. Imaging tests create pictures of the inside of the body.  Some people need a liver biopsy. During this test, a doctor removes a small sample of tissue from the liver. Then, another doctor looks at the sample under a microscope to check for MASH. A liver biopsy is the only test that can tell for sure if you have MASH. Your doctor might do this test if they are not sure if you have MASH or to see how inflamed your liver is. If your blood tests and imaging tests are normal, you will not need a liver biopsy.  How is MASH treated?   It is not typically treated directly. But it can improve when related medical conditions get treated. For example, losing weight and controlling high blood sugar and cholesterol can help improve MASH.  Your doctor can:  ?Help you lose weight, if you have excess body weight - If your doctor recommends losing weight, they can help you make a plan to do this safely. It's important not to lose weight too quickly. Do not lose more than 2 pounds (approximately 1 kilogram) a week.  ?Treat your high blood sugar, if you have diabetes  ?Treat your high cholesterol, if you have it  Making these changes has benefits besides helping with MASH. These changes can also lower your chances of having a heart attack or stroke. That's important because people with MASH are often also at risk for heart disease and stroke.  Your doctor might recommend other things, too. For example:  ?You might get vaccines to protect against hepatitis A and B. These are infections that can harm your liver.  ?If you have MASH but do not also have diabetes, your doctor might suggest that you take vitamin E. A few studies  "suggest that vitamin E can reduce some of the liver damage and inflammation that happens with MASH. But there are also studies that suggest that high doses of vitamin E increase the risk of death. So do not take vitamin E unless your doctor or nurse recommends it.  What can I do on my own?   You can:  ?Take all of your medicines as instructed.  ?Avoid alcohol. Alcohol can make liver problems worse.  ?Eat a healthy diet with plenty of vegetables, fruits, and whole grains.  ?Get regular physical activity. Even gentle activity, like walking, is good for your health.  ?Go to all of your doctor's appointments.  Does MASH get worse over time?   It might. Sometimes, people with MASH get something called \"cirrhosis.\" This means serious scarring of the liver. Cirrhosis can cause different symptoms, such as swelling in the legs, trouble breathing, or feeling tired.  People who have MASH need to see their doctor for regular check-ups. Your doctor will do follow-up tests regularly. These usually include blood tests.  When should I call the doctor?   Call your doctor or nurse if you have symptoms of cirrhosis. These can include:  ?Blood in your bowel movements or vomit  ?Symptoms of infection, such as fever over 100.4 F (38 C) or chills  ?Belly pain  ?Swollen legs or ankles  ?Trouble breathing  ?Extreme tiredness  ?Confusion  ?Yellowing of the skin or whites of your eyes, called jaundice     ____________________________________________________________________  Please see below for any additional questions and scheduling guidelines.    Sign up for [facility]: [facility] patient portal serves as a secure platform for accessing your medical records from the [facility]. Additionally, [employer] facilitates easy, timely, and secure messaging with your care team. If you have not signed up, you may do so by using the provided code or calling [phone].    Coordinating your care after your visit:  There are multiple options for " scheduling your follow-up care based on your provider's recommendation.    How do I schedule a follow-up clinic appointment:   After your appointment, you may receive scheduling assistance with the Clinic Coordinators by having a seat in the waiting room and a Clinic Coordinator will call you up to schedule.  Virtual visits or after you leave the clinic:  Your provider has placed a follow-up order in the [employer] portal for scheduling your return appointment. A member of the scheduling team will contact you to schedule.  [facility] Sched[facility]: Timely scheduling through [employer] is advised to ensure appointment availability.   Call to schedule: You may schedule your follow-up appointment(s) by calling [phone], option 1.    How do I schedule my endoscopy or colonoscopy procedure:  If a procedure, such as a colonoscopy or upper endoscopy was ordered by your provider, the scheduling team will contact you to schedule this procedure. Or you may choose to call to schedule at   [phone], option 2.  Please allow 20-30 minutes when scheduling a procedure.    How do I get my blood work done? To get your blood work done, you need to schedule a lab appointment at an [facility]. There are multiple ways to schedule:   At the clinic: The Clinic Coordinator you meet after your visit can help you schedule a lab appointment.   [employer] scheduling: [employer] offers online lab scheduling at all [facility] laboratory locations.   Call to schedule: You can call [phone] to schedule your lab appointment.    How do I schedule my imaging study: To schedule imaging studies, such as CT scans, ultrasounds, MRIs, or X-rays, contact Imaging Services at [phone].    How do I schedule a referral to another doctor: If your provider recommended a referral to another specialist(s), the referral order was placed by your provider. You will receive a phone call to schedule this referral, or you may choose to call the  number attached to the referral to self-schedule.    For Post-Visit Question(s):  For any inquiries following today's visit:  Please utilize [employer] messaging and allow 48 hours for reply or contact the Call Center during normal business hours at [phone], option 3.  For Emergent After-hours questions, contact the On-Call GI Fellow through the [facility] at [phone].  In addition, you may contact your Nurse directly using the provided contact information.    Test Results:  Test results will be accessible via [employer] in compliance with the 21st Century Cures Act. This means that your results will be available to you at the same time as your provider. Often you may see your results before your provider does. Results are reviewed by staff within two weeks with communication follow-up. Results may be released in the patient portal prior to your care team review.    Prescription Refill(s):  Medication prescribed by your provider will be addressed during your visit. For future refills, please coordinate with your pharmacy. If you have not had a recent clinic visit or routine labs, for your safety, your provider may not be able to refill your prescription.     Sincerely,  POOL Milner,  [facility],  Division of Gastroenterology   ([facility])

## 2025-06-09 ENCOUNTER — OFFICE VISIT (OUTPATIENT)
Dept: GASTROENTEROLOGY | Facility: CLINIC | Age: 70
End: 2025-06-09
Attending: NURSE PRACTITIONER
Payer: MEDICARE

## 2025-06-09 ENCOUNTER — HOSPITAL ENCOUNTER (OUTPATIENT)
Dept: CT IMAGING | Facility: CLINIC | Age: 70
Discharge: HOME | End: 2025-06-09
Attending: INTERNAL MEDICINE | Admitting: INTERNAL MEDICINE
Payer: MEDICARE

## 2025-06-09 VITALS
SYSTOLIC BLOOD PRESSURE: 122 MMHG | HEIGHT: 62 IN | HEART RATE: 80 BPM | BODY MASS INDEX: 40.67 KG/M2 | DIASTOLIC BLOOD PRESSURE: 64 MMHG | WEIGHT: 221 LBS

## 2025-06-09 DIAGNOSIS — Q43.8 TORTUOUS COLON: ICD-10-CM

## 2025-06-09 DIAGNOSIS — K57.30 DIVERTICULAR DISEASE OF COLON: Primary | ICD-10-CM

## 2025-06-09 DIAGNOSIS — K76.0 HEPATIC STEATOSIS: ICD-10-CM

## 2025-06-09 DIAGNOSIS — Z87.891 HISTORY OF TOBACCO USE, PRESENTING HAZARDS TO HEALTH: ICD-10-CM

## 2025-06-09 PROCEDURE — 71271 CT THORAX LUNG CANCER SCR C-: CPT

## 2025-06-09 ASSESSMENT — PAIN SCALES - GENERAL: PAINLEVEL_OUTOF10: NO PAIN (0)

## 2025-06-09 NOTE — LETTER
"6/9/2025      Gena Houston  [address] [facility] [address]      Dear Colleague,    Thank you for referring your patient, Gena Houston, to the [facility]. Please see a copy of my visit note below.      [facility]  [address] [facility] [address]  Phone: [phone]    Patient:  Gena Houston, Date of birth 1955    Referring Provider: Elia Milner Paul D      Gastroenterology CLINIC VISIT, RETURN PATIENT    REASON FOR CONSULTATION: follow up    HPI: 69 year old female presented to GI clinic for a follow up. PMH of cholecystectomy, HTN, bilat. hearing loss, hepatic steatosis, and colonic diverticulosis. The patient was seen for BRBPR and alternation of diarrhea and constipation X several months.   CT scan of abdomen which was suspicious for possible enteritis. Found to have diverticulosis with no signs of diverticulitis. Underwent colonoscopy that came back unremarkable.    Patient stated that most of her symptoms had subsided or resolved. Takes 1000 mg of Citrucel in the morning. Her bowel elimination is back to normal. Having 1-2 formed stools a day. At times, notices small amount of BRBPR on tissues, hx of hemorrhoids. No rectal pain. No black stools.  Reported rare acid reflux episodes, takes Tums as needed. No nausea or vomiting. Working on weight loss.    Patient pointed on scattered pink macules on her neck, wondering about possible allergic reaction to Citrucel.   Patient stated that her and her family are known for having multiple food sensitivities and allergies. Said that she cannot eat \"bagged salads\" because it would cause loose stools. Exposure to mold makes her sick. At times, she feels bloated after consumption of wheat or corn. She recalls having skin prick tests about 20 years ago and was told that she has multiple environmental allergies.stated that most of her sensitivity reactions are manifested by " sensation of congestion, dyspepsia, and swelling.  Denies history of anaphylactic reactions.  No family hx of celiac disease. Stated that she avoids foods that have preservatives (canned foods, pre-washed veggies, processed foods).  Scheduled to see an allergy specialist in September.    Wt Readings from Last 10 Encounters:   06/09/25 100.2 kg (221 lb)   05/20/25 99.6 kg (219 lb 9.6 oz)   04/16/25 101.8 kg (224 lb 8 oz)   12/06/24 103.2 kg (227 lb 8 oz)   10/18/24 104.6 kg (230 lb 8 oz)   10/14/24 103.2 kg (227 lb 9.6 oz)   09/24/24 102.1 kg (225 lb)       PREVIOUS ENDOSCOPY:  4/3/2024  Colonoscopy at [facility] was performed, which was normal. The colonoscope was passed into the rectum and advanced all the way to the cecum. The ileocecal valve and appendiceal orifice were identified and photographed to confirm advancement to the cecum. The colonoscope was slowly withdrawn.  No pathology was found. The colonoscope was retroflexed in the rectum and the anal canal appeared to be normal. Rectal biopsies obtained with cold forceps.      FINAL DIAGNOSIS     A. RECTUM, BIOPSY:  -Rectal mucosa with no significant abnormality         PERTINENT STUDIES Reviewed in EMR  2/26/2024 CT abdomen/pelvis  CT ABDOMEN:   Lung Bases:  Lung bases are grossly clear.   Liver:      Liver with diffuse low-attenuation of the liver parenchyma without suspicious liver lesions.   Gallbladder:  Cholecystectomy   Biliary ducts:  No significant change in the biliary ductal system compared to previous exam..   Pancreas:    Pancreas is grossly unremarkable.   Stomach:    Stomach is not fully distended. Mild gastric fold thickening and circumferential thickening of the distal esophagus appears essentially similar.   Spleen:    Spleen is  unremarkable. Appearance of medial and inferior aspect of the spleen is essentially similar with lobulation   Small bowel:  Small bowel loops are overall within normal limits in diameter without evidence  for obstruction. However, short segment of jejunum proximally beyond the ligament of Treitz of almost 2 cm segment with a small posterior calcification (noncontrast axial image 30 and in the postcontrast axial image 33/107 with adjacent mural fat attenuated density (axial image 27/107 precontrast axial image and 29/107 post contrast axial image) without proximal obstruction changes.   Abdominal colon:Colon is grossly unremarkable with scattered few diverticula of the sigmoid without evidence for acute appearing changes. However, contrast has not extended into the left side and the rectosigmoid without distention slightly limits the evaluation. No acute changes. Small appendix appear normal.   Abdominal aorta:  Moderate prominently calcific atherosclerotic vascular disease of the abdominal aorta extending into iliac vessels.   Adrenal glands:  No adrenal masses but left adrenal hyperplasia.   Kidneys:    Both kidneys enhances symmetrically. No evidence to suggest obstruction and right lower pole intrarenal nonobstructing papillary calcification appears slightly increased but not significantly changed measuring up to 6.5 mm maximally   Retroperitoneum/ Mesentry:No retroperitoneal or mesenteric root adenopathy except a few normal size lymph nodes and not significantly changed compared to 2020 exam.   CT Pelvis:   Urinary bladder:  Bladder is not fully distended but no acute changes.   Pelvic bowel:  Pelvic bowel with tortuosity. Segments of rectum and sigmoid not distended especially the segment of the proximal rectum with mild wall thickening in this patient with history of proctitis by CT is limited in evaluation . In fact portion of the distal rectum is slightly distended with air on this exam without significant mass like regions. No free air or free fluid.   Uterus:    Hysterectomy   Abdominal wall:  No abdominal wall masses.   Bony structures:  Degenerative disc disease at the lower thoracic levels and especially  at L5-S1 the disc osteophyte complex encroaching into left and neural foramina and facet joint arthritis on the left. Bilateral SI joint with mild vacuum phenomenon.     IMPRESSION:   1. Nondistention of the rectosigmoid with absence of enteric contrast into this segment. But mild wall thickening of the rectosigmoid junction and proximal rectum. Overall improved appearance of distal rectum compared to previous exam without acute appearing changes or obvious other significant findings to implicate etiology of change in bowel habit.   2. Most of the colon up to mid left side is distended and contrast-filled without remarkable findings. Mild diverticulosis of the left  distal colon and the sigmoid without evidence for acute appearing changes.   2. Segment of proximal jejunum of approximately 2 cm with posterior wall calcification and small possible jejunal wall lipoma appear benign.   4. At least mild hepatic steatosis and mild left adrenal hyperplasia unchanged. Mild circumferential thickening of the distal esophagus filled with air is also not significantly changed in pattern.   5. Nominal increased size of nonobstructing right lower pole intrarenal calculus Moderate disc disease at L5-S1 asymmetric to the left with the disc osteophyte complex to some extent encroaching into neural foramina.       ROS: 10pt ROS performed and otherwise negative.    PAST MEDICAL HISTORY:  No past medical history on file.    PREVIOUS ABDOMINAL/GYNECOLOGIC SURGERIES:  No past surgical history on file.      PERTINENT MEDICATIONS:  Current Outpatient Medications   Medication Sig Dispense Refill     aspirin (ASA) 81 MG chewable tablet Take 1 tablet (81 mg) by mouth daily.       calcium carbonate (OS-FRANCISCO) 500 MG tablet Take 1 tablet (500 mg) by mouth 2 times daily.       carboxymethylcellulose PF (REFRESH LIQUIGEL) 1 % ophthalmic gel 1 drop 3 times daily.       celecoxib (CELEBREX) 200 MG capsule Take 1 capsule (200 mg) by mouth daily.        conjugated estrogens (PREMARIN) 0.625 MG/GM vaginal cream Place 0.5 g vaginally twice a week.       furosemide (LASIX) 40 MG tablet Take 1 tablet (40 mg) by mouth daily. 90 tablet 3     methylcellulose (CITRUCEL) 500 MG TABS tablet Take 2 tablets (1,000 mg) by mouth daily. 60 tablet 11     Omega-3 Fish Oil 500 MG capsule Take 1 capsule (500 mg) by mouth daily.       potassium chloride ryan ER (KLOR-CON M20) 20 MEQ CR tablet Take 1 tablet (20 mEq) by mouth daily. 90 tablet 3     rosuvastatin (CRESTOR) 5 MG tablet Take 1 tablet (5 mg) by mouth daily. 90 tablet 3     telmisartan-HCTZ (MICARDIS HCT) 80-25 MG tablet Take 1 tablet by mouth daily. 90 tablet 3     vitamin D3 (CHOLECALCIFEROL) 50 mcg (2000 units) tablet Take 1 tablet (50 mcg) by mouth 2 times daily.           SOCIAL HISTORY:  Social History     Socioeconomic History     Marital status:      Spouse name: Not on file     Number of children: Not on file     Years of education: Not on file     Highest education level: Not on file   Occupational History     Not on file   Tobacco Use     Smoking status: Former     Average packs/day: 1 pack/day for 38.0 years (38.0 ttl pk-yrs)     Types: Cigarettes     Start date: 01/1973     Smokeless tobacco: Never   Vaping Use     Vaping status: Never Used   Substance and Sexual Activity     Alcohol use: Not on file     Drug use: Not on file     Sexual activity: Not on file   Other Topics Concern     Not on file   Social History Narrative     Not on file     Social Drivers of Health     Financial Resource Strain: Not on file   Food Insecurity: Not on file   Transportation Needs: No Transportation Needs (4/1/2021)    Received from [facility] and [facility]    PRAPARE - Transportation      Lack of Transportation (Medical): No      Lack of Transportation (Non-Medical): No   Physical Activity: Insufficiently Active (4/1/2021)    Received from [facility] and [facility]    Exercise Vital Sign      Days of Exercise per  "Week: 3 days      Minutes of Exercise per Session: 40 min   Stress: No Stress Concern Present (4/1/2021)    Received from [facility]    [employer] [place] [employer] - Occupational Stress Questionnaire      Feeling of Stress : Only a little   Social Connections: Socially Integrated (4/1/2021)    Received from [facility]    Social Connection and Isolation Panel [NHANES]      Frequency of Communication with Friends and Family: Three times a week      Frequency of Social Gatherings with Friends and Family: Once a week      Attends Scientologist Services: 1 to 4 times per year      Active Member of Clubs or Organizations: Yes      Attends Club or Organization Meetings: More than 4 times per year      Marital Status:    Interpersonal Safety: Low Risk  (5/20/2025)    Interpersonal Safety      Do you feel physically and emotionally safe where you currently live?: Yes      Within the past 12 months, have you been hit, slapped, kicked or otherwise physically hurt by someone?: No      Within the past 12 months, have you been humiliated or emotionally abused in other ways by your partner or ex-partner?: No   Housing Stability: Not on file       FAMILY HISTORY:  Denies colon/panc/esophageal/other GI CA, no other [place] or other HPS-related Ne. No IBD/celiac, no other AI/liver/thyroid disease.    No family history on file.    PHYSICAL EXAMINATION:  Vitals reviewed  /64 (BP Location: Right arm, Patient Position: Sitting, Cuff Size: Adult Large)   Pulse 80   Ht 1.585 m (5' 2.4\")   Wt 100.2 kg (221 lb)   Breastfeeding No   BMI 39.90 kg/m      General: Patient appears well in no acute distress.    Skin: No visualized rash or lesions on visualized skin  HEENT:    EOMI, no erythema, sclera icterus or discharge noted.  Mouth mucosa intact, pink, moist  No cervical or supraclavicular lymphadenopathy. Thyroid gland not enlarged.  Resp: breathing comfortably without accessory " muscle usage, speaking in full sentences, no cough. Lung sounds clear  Card: Regular and rhythmic S1 and S2. No gallop or rub. No murmur.  No LE edema.  Abdomen: Active bowel sounds X 4 quadrants. Soft to palpation.  No guarding or rebound tenderness. Kenyon's sign negative.  MSK: Appears to have normal range of motion based on visualized movements  Neurologic: No apparent tremors, facial movements symmetric  Psych: affect normal, alert and oriented      ASSESSMENT/PLAN:    ICD-10-CM    1. Diverticular disease of colon  K57.30 methylcellulose (CITRUCEL) 500 MG TABS tablet      2. Tortuous colon  Q43.8       3. Hepatic steatosis  K76.0          69 year old female  presented to GI clinic for a follow up.  Was seen for episodes of BRBPR, abdominal discomfort, and irregular stool pattern of alternation between diarrhea and constipation.  Underwent CT of abdomen/pelvis and colonoscopy in February and April 2024.  Noted questionable mild colitis on CT and a comment on tortuous colon.  There was a segment of proximal jejunum of approximately 2 cm with posterior wall calcification and small possible jejunal wall lipoma that appeared benign.  Noted sigmoid diverticulosis was no signs of diverticulitis.  Incidental finding of hepatic steatosis.  Patient had unremarkable colonoscopy.  Was started on Citrucel. Reported resolution of majority of her symptoms. Has BM every day, formed stools. No abdominal pain. No bloating. Occasionally, experiences nausea in the morning. Noticing BRBPR at times. Pt is aware of available OTC anti hemorrhoidal creams/suppository to use as needed. Prefers to watch her diet to prevent flare ups of hemorrhoids.  Suggested to continue Citrucel.  Planning to see dermatology in July and allergist in September for intermittent rashes and sensitivity to environmental agents and food additives.    Patient verbalized understanding and appreciation of care provided. Stated that all of the questions were  answered to her/his satisfaction.  RTC in one year or as needed    Thank you for this consultation. It was a pleasure to participate in the care of this patient; please contact us with any further questions.    NENO Milner, BOBBYP-C  Division of Gastroenterology  [facility], [place], [place]    This note was created with Dragon voice recognition software, and while reviewed for accuracy, inadvertent minor typographic errors may occur. Please contact the provider if you have any questions.     Again, thank you for allowing me to participate in the care of your patient.        Sincerely,        NENO MILNER CNP    Electronically signed

## 2025-06-16 ENCOUNTER — HOSPITAL ENCOUNTER (OUTPATIENT)
Dept: MAMMOGRAPHY | Facility: CLINIC | Age: 70
Discharge: HOME | End: 2025-06-16
Attending: INTERNAL MEDICINE | Admitting: INTERNAL MEDICINE
Payer: MEDICARE

## 2025-06-16 DIAGNOSIS — Z12.31 VISIT FOR SCREENING MAMMOGRAM: ICD-10-CM

## 2025-06-16 PROCEDURE — 77067 SCR MAMMO BI INCL CAD: CPT

## 2025-07-10 NOTE — PROGRESS NOTES
Office Visit-Follow up    Chief Complaint: Gena Houston is a 69 year old female who is being seen for   Chief Complaint   Patient presents with    Right Knee - Follow Up       History of Present Illness:   Today's visit:  At her last visit she received a right knee intra-articular corticosteroid injection. She reports about 6 weeks of relief. She also notes will be seeing a specialty metal allergist in January for further testing on sensitivities and allergies. She also does note will get mild reaction from aluminum, zinc, and some other metals.  The pain is same as previous. Mostly medial. Worse with activity and by end of the day.       April 16, 2025 office visit:  At her last visit she underwent a intra-articular corticosteroid injection to her right knee as well as the recommendation for physical therapy.  She is doing quite well with the injection.  Very happy with that.  Would like to repeat it.  She is very apprehensive about proceeding with knee replacement due to her nickel allergy.     October 18, 2024 office visit:  Here for right knee.  Reports many years of pain, last year getting much worse. Progressive. Constant pain.  Medial sided. Non radiating.  The left side is mildly bothersome and currently not painful.  No specific injuries to the knee.  Treatments trieid: previous cortisone injection in distant past, previous Synvisc 1 injections, formal physical therapy in the past for the ankles that also worked on the legs and knees, tylenol, rest, activity modification. Despite this having constant progressive pain, waking her at night, reports unable to walk >1 block before pain limits activity, decreased other activities.      Epic reviewed.  Outside orthopedics July 18, 2024 underwent bilateral Synvisc 1 injections.          Social History     Occupational History    Not on file   Tobacco Use    Smoking status: Former     Average packs/day: 1 pack/day for 38.0 years (38.0 ttl pk-yrs)     Types:  Cigarettes     Start date: 01/1973    Smokeless tobacco: Never   Vaping Use    Vaping status: Never Used   Substance and Sexual Activity    Alcohol use: Not on file    Drug use: Not on file    Sexual activity: Not on file       REVIEW OF SYSTEMS  General: negative for, night sweats, dizziness, fatigue  Resp: No shortness of breath and no cough  CV: negative for chest pain, syncope or near-syncope  GI: negative for nausea, vomiting and diarrhea  : negative for dysuria and hematuria  Musculoskeletal: as above  Neurologic: negative for syncope   Hematologic: negative for bleeding disorder    Physical Exam:  Constitutional: healthy, alert and no acute distress   Psychiatric: mentation appears normal and affect normal/bright  NEURO: no focal deficits  RESP: Normal with easy respirations and no use of accessory muscles to breathe, no audible wheezing or retractions  CV: No peripheral edema         Regular rate and rhythm by palpation  SKIN: No erythema, rashes, excoriation, or breakdown. No evidence of infection.   JOINT/EXTREMITIES:right knee: Full motion. Mild effusion.  Patella tracks midline.  No evidence of infection.  No soft tissue or bursal swelling.  Some tenderness along medial joint line.  GAIT: not tested             Diagnostic Modalities:  Previous imaging reviewed. Bone on bone medial compartment  Independent visualization of the images was performed.    Impression: right knee primary osteoarthritis     Plan:  All of the above pertinent physical exam and imaging modalities findings was reviewed with Gena.  Discussed options. She would like to attempt another steroid injection. We did discuss gel injections and she has had this in the past with some relief. We discussed that if she only got 4-6 weeks relief of the steroid then to call the office and we will start the PA process for visco supplement.  She would not be interested in surgery until after seeing the metal allergist in January.                                             INJECTION PROCEDURE:  The patient was counseled about an  injection, including discussion of risks (including infection), contents of the injection, rationale for performing the injection, and expected benefits of the injection. The skin was prepped with alcohol and betadine and then utilizing sterile technique an injection of the right knee joint from the anterolateral approach in the seated position was performed. The injection consisted 1ml of Kenalog (40mg per 1 ml) mixed with 3ml of 0.5% Marcaine. The patient tolerated the injection well, and there were no complications. The injection site was covered with a Band-Aid. The injection was performed by NENO Fields, WHIT, DNP          Return to clinic: will call if proceeding with gel injection or anytime after 3 months for repeat steroid injection, or sooner as needed for changes.  Re-x-ray on return: No    Dr. Morgan was present in the office.  He personally discussed the care plan and reviewed all appropriate imaging.    NENO Gardner, CNP  Orthopedic Surgery

## 2025-07-13 ASSESSMENT — KOOS JR
TWISING OR PIVOTING ON KNEE: SEVERE
GOING UP OR DOWN STAIRS: SEVERE
BENDING TO THE FLOOR TO PICK UP OBJECT: SEVERE
STRAIGHTENING KNEE FULLY: MODERATE
KOOS JR SCORING: 36.93
STANDING UPRIGHT: SEVERE
RISING FROM SITTING: SEVERE
HOW SEVERE IS YOUR KNEE STIFFNESS AFTER FIRST WAKING IN MORNING: SEVERE

## 2025-07-16 ENCOUNTER — MEDICAL CORRESPONDENCE (OUTPATIENT)
Dept: HEALTH INFORMATION MANAGEMENT | Facility: CLINIC | Age: 70
End: 2025-07-16
Payer: MEDICARE

## 2025-07-16 ENCOUNTER — TRANSFERRED RECORDS (OUTPATIENT)
Dept: HEALTH INFORMATION MANAGEMENT | Facility: CLINIC | Age: 70
End: 2025-07-16
Payer: MEDICARE

## 2025-07-16 ENCOUNTER — TRANSCRIBE ORDERS (OUTPATIENT)
Dept: OTHER | Age: 70
End: 2025-07-16

## 2025-07-16 DIAGNOSIS — H02.831 DERMATOCHALASIS OF RIGHT UPPER EYELID: Primary | ICD-10-CM

## 2025-07-17 ENCOUNTER — OFFICE VISIT (OUTPATIENT)
Dept: ORTHOPEDICS | Facility: CLINIC | Age: 70
End: 2025-07-17
Payer: MEDICARE

## 2025-07-17 ENCOUNTER — PATIENT OUTREACH (OUTPATIENT)
Dept: CARE COORDINATION | Facility: CLINIC | Age: 70
End: 2025-07-17

## 2025-07-17 DIAGNOSIS — M17.11 PRIMARY OSTEOARTHRITIS OF RIGHT KNEE: Primary | ICD-10-CM

## 2025-07-17 RX ORDER — BUPIVACAINE HYDROCHLORIDE 2.5 MG/ML
3 INJECTION, SOLUTION INFILTRATION; PERINEURAL
Status: COMPLETED | OUTPATIENT
Start: 2025-07-17 | End: 2025-07-17

## 2025-07-17 RX ORDER — TRIAMCINOLONE ACETONIDE 40 MG/ML
40 INJECTION, SUSPENSION INTRA-ARTICULAR; INTRAMUSCULAR
Status: COMPLETED | OUTPATIENT
Start: 2025-07-17 | End: 2025-07-17

## 2025-07-17 RX ADMIN — TRIAMCINOLONE ACETONIDE 40 MG: 40 INJECTION, SUSPENSION INTRA-ARTICULAR; INTRAMUSCULAR at 08:21

## 2025-07-17 RX ADMIN — BUPIVACAINE HYDROCHLORIDE 3 ML: 2.5 INJECTION, SOLUTION INFILTRATION; PERINEURAL at 08:21

## 2025-07-17 NOTE — PROGRESS NOTES
Large Joint Injection/Arthocentesis: R knee joint    Date/Time: 7/17/2025 8:21 AM    Performed by: Emeka Akbar APRN CNP  Authorized by: Camron Morgan DO    Indications:  Pain  Needle Size:  25 G  Guidance: landmark guided    Approach:  Anterolateral  Location:  Knee      Medications:  40 mg triamcinolone 40 MG/ML; 3 mL BUPivacaine 0.25 %  Outcome:  Tolerated well, no immediate complications  Procedure discussed: discussed risks, benefits, and alternatives    Consent Given by:  Patient  Timeout: timeout called immediately prior to procedure    Prep: patient was prepped and draped in usual sterile fashion

## 2025-07-17 NOTE — LETTER
7/17/2025      Gena Houston  [address] [facility] [address]      Dear Colleague,    Thank you for referring your patient, Gena Houston, to the [facility]. Please see a copy of my visit note below.    Office Visit-Follow up    Chief Complaint: Gena Houston is a 69 year old female who is being seen for   Chief Complaint   Patient presents with     Right Knee - Follow Up       History of Present Illness:   Today's visit:  At her last visit she received a right knee intra-articular corticosteroid injection. She reports about 6 weeks of relief. She also notes will be seeing a specialty metal allergist in January for further testing on sensitivities and allergies. She also does note will get mild reaction from aluminum, zinc, and some other metals.  The pain is same as previous. Mostly medial. Worse with activity and by end of the day.       April 16, 2025 office visit:  At her last visit she underwent a intra-articular corticosteroid injection to her right knee as well as the recommendation for physical therapy.  She is doing quite well with the injection.  Very happy with that.  Would like to repeat it.  She is very apprehensive about proceeding with knee replacement due to her nickel allergy.     October 18, 2024 office visit:  Here for right knee.  Reports many years of pain, last year getting much worse. Progressive. Constant pain.  Medial sided. Non radiating.  The left side is mildly bothersome and currently not painful.  No specific injuries to the knee.  Treatments trieid: previous cortisone injection in distant past, previous Synvisc 1 injections, formal physical therapy in the past for the ankles that also worked on the legs and knees, tylenol, rest, activity modification. Despite this having constant progressive pain, waking her at night, reports unable to walk >1 block before pain limits activity, decreased other activities.      Epic reviewed.  Outside orthopedics  July 18, 2024 underwent bilateral Synvisc 1 injections.          Social History     Occupational History     Not on file   Tobacco Use     Smoking status: Former     Average packs/day: 1 pack/day for 38.0 years (38.0 ttl pk-yrs)     Types: Cigarettes     Start date: 01/1973     Smokeless tobacco: Never   Vaping Use     Vaping status: Never Used   Substance and Sexual Activity     Alcohol use: Not on file     Drug use: Not on file     Sexual activity: Not on file       REVIEW OF SYSTEMS  General: negative for, night sweats, dizziness, fatigue  Resp: No shortness of breath and no cough  CV: negative for chest pain, syncope or near-syncope  GI: negative for nausea, vomiting and diarrhea  : negative for dysuria and hematuria  Musculoskeletal: as above  Neurologic: negative for syncope   Hematologic: negative for bleeding disorder    Physical Exam:  Constitutional: healthy, alert and no acute distress   Psychiatric: mentation appears normal and affect normal/bright  NEURO: no focal deficits  RESP: Normal with easy respirations and no use of accessory muscles to breathe, no audible wheezing or retractions  CV: No peripheral edema         Regular rate and rhythm by palpation  SKIN: No erythema, rashes, excoriation, or breakdown. No evidence of infection.   JOINT/EXTREMITIES:right knee: Full motion. Mild effusion.  Patella tracks midline.  No evidence of infection.  No soft tissue or bursal swelling.  Some tenderness along medial joint line.  GAIT: not tested             Diagnostic Modalities:  Previous imaging reviewed. Bone on bone medial compartment  Independent visualization of the images was performed.    Impression: right knee primary osteoarthritis     Plan:  All of the above pertinent physical exam and imaging modalities findings was reviewed with Gena.  Discussed options. She would like to attempt another steroid injection. We did discuss gel injections and she has had this in the past with some relief. We  discussed that if she only got 4-6 weeks relief of the steroid then to call the office and we will start the PA process for visco supplement.  She would not be interested in surgery until after seeing the [facility] allergist in January.                                            INJECTION PROCEDURE:  The patient was counseled about an  injection, including discussion of risks (including infection), contents of the injection, rationale for performing the injection, and expected benefits of the injection. The skin was prepped with alcohol and betadine and then utilizing sterile technique an injection of the right knee joint from the anterolateral approach in the seated position was performed. The injection consisted 1ml of Kenalog (40mg per 1 ml) mixed with 3ml of 0.5% Marcaine. The patient tolerated the injection well, and there were no complications. The injection site was covered with a Band-Aid. The injection was performed by NENO Fields, WHIT, DNP          Return to clinic: will call if proceeding with gel injection or anytime after 3 months for repeat steroid injection, or sooner as needed for changes.  Re-x-ray on return: No    Dr. Morgan was present in the office.  He personally discussed the care plan and reviewed all appropriate imaging.    NENO Gardner, CNP  Orthopedic Surgery      Large Joint Injection/Arthocentesis: R knee joint    Date/Time: 7/17/2025 8:21 AM    Performed by: Emeka Akbar APRN CNP  Authorized by: Camron Morgan DO    Indications:  Pain  Needle Size:  25 G  Guidance: landmark guided    Approach:  Anterolateral  Location:  Knee      Medications:  40 mg triamcinolone 40 MG/ML; 3 mL BUPivacaine 0.25 %  Outcome:  Tolerated well, no immediate complications  Procedure discussed: discussed risks, benefits, and alternatives    Consent Given by:  Patient  Timeout: timeout called immediately prior to procedure    Prep: patient was prepped and draped in usual  sterile fashion          Again, thank you for allowing me to participate in the care of your patient.        Sincerely,        Camron Morgan, DO    Electronically signed

## 2025-07-21 ENCOUNTER — PATIENT OUTREACH (OUTPATIENT)
Dept: CARE COORDINATION | Facility: CLINIC | Age: 70
End: 2025-07-21
Payer: MEDICARE

## 2025-08-21 ENCOUNTER — OFFICE VISIT (OUTPATIENT)
Dept: ORTHOPEDICS | Facility: CLINIC | Age: 70
End: 2025-08-21
Payer: MEDICARE

## 2025-08-21 DIAGNOSIS — M17.11 PRIMARY OSTEOARTHRITIS OF RIGHT KNEE: Primary | ICD-10-CM
